# Patient Record
Sex: FEMALE | Race: ASIAN | NOT HISPANIC OR LATINO | URBAN - METROPOLITAN AREA
[De-identification: names, ages, dates, MRNs, and addresses within clinical notes are randomized per-mention and may not be internally consistent; named-entity substitution may affect disease eponyms.]

---

## 2023-08-22 ENCOUNTER — EMERGENCY (EMERGENCY)
Facility: HOSPITAL | Age: 28
LOS: 0 days | Discharge: HOME | End: 2023-08-22
Attending: STUDENT IN AN ORGANIZED HEALTH CARE EDUCATION/TRAINING PROGRAM
Payer: COMMERCIAL

## 2023-08-22 VITALS
OXYGEN SATURATION: 100 % | WEIGHT: 123.46 LBS | TEMPERATURE: 98 F | SYSTOLIC BLOOD PRESSURE: 119 MMHG | DIASTOLIC BLOOD PRESSURE: 64 MMHG | HEART RATE: 74 BPM | RESPIRATION RATE: 18 BRPM | HEIGHT: 61 IN

## 2023-08-22 VITALS
HEART RATE: 87 BPM | OXYGEN SATURATION: 99 % | SYSTOLIC BLOOD PRESSURE: 109 MMHG | DIASTOLIC BLOOD PRESSURE: 64 MMHG | RESPIRATION RATE: 18 BRPM | TEMPERATURE: 98 F

## 2023-08-22 DIAGNOSIS — F41.9 ANXIETY DISORDER, UNSPECIFIED: ICD-10-CM

## 2023-08-22 DIAGNOSIS — Z13.30 ENCOUNTER FOR SCREENING EXAMINATION FOR MENTAL HEALTH AND BEHAVIORAL DISORDERS, UNSPECIFIED: ICD-10-CM

## 2023-08-22 DIAGNOSIS — F32.A DEPRESSION, UNSPECIFIED: ICD-10-CM

## 2023-08-22 DIAGNOSIS — R51.9 HEADACHE, UNSPECIFIED: ICD-10-CM

## 2023-08-22 PROCEDURE — 99285 EMERGENCY DEPT VISIT HI MDM: CPT

## 2023-08-22 PROCEDURE — 93010 ELECTROCARDIOGRAM REPORT: CPT

## 2023-08-22 PROCEDURE — 70450 CT HEAD/BRAIN W/O DYE: CPT | Mod: 26,MG

## 2023-08-22 PROCEDURE — G1004: CPT

## 2023-08-22 RX ORDER — ALPRAZOLAM 0.25 MG
0.25 TABLET ORAL ONCE
Refills: 0 | Status: DISCONTINUED | OUTPATIENT
Start: 2023-08-22 | End: 2023-08-22

## 2023-08-22 RX ORDER — SODIUM CHLORIDE 9 MG/ML
1000 INJECTION INTRAMUSCULAR; INTRAVENOUS; SUBCUTANEOUS ONCE
Refills: 0 | Status: COMPLETED | OUTPATIENT
Start: 2023-08-22 | End: 2023-08-22

## 2023-08-22 RX ADMIN — Medication 0.25 MILLIGRAM(S): at 21:50

## 2023-08-22 NOTE — ED ADULT NURSE NOTE - CHIEF COMPLAINT QUOTE
depress unable to sleep , neck pain and headache sometimes, came a moth ago from [facility] waiting for a green card expecting it 2 weeks , pt stress duo to unable to get a job and unable to help family back home , pt has a good support with  . Denies S/I H/I  , pt had  a similar episode 4 years ago.

## 2023-08-22 NOTE — ED PROVIDER NOTE - PATIENT PORTAL LINK FT
You can access the FollowMyHealth Patient Portal offered by [facility] by registering at the following website: [email or web address]. By joining [employer]’s FollowMyHealth portal, you will also be able to view your health information using other applications (apps) compatible with our system.

## 2023-08-22 NOTE — ED PROVIDER NOTE - NS ED ROS FT
General: Denies fever, chills  HEENT: Denies sensory changes, sore throat  Neck: Denies neck pain, neck stiffness  Resp: Denies coughing, SOB  Cardiovascular: Denies CP, palpitations, LE edema  GI: Denies nausea, vomiting, abdominal pain, diarrhea, constipation, blood in stool  : Denies dysuria, hematuria, frequency, incontinence  MSK: Denies back pain  Neuro: + HA,  Denies dizziness, numbness, weakness  Skin: Denies rashes.

## 2023-08-22 NOTE — ED PROVIDER NOTE - PROGRESS NOTE DETAILS
sleeping comfortably in the bed. symptoms improved. f/u outpatient w/ primary care/psychiatry discussed w/ patient and .

## 2023-08-22 NOTE — ED PROVIDER NOTE - PHYSICAL EXAMINATION
General: Well appearing female in no acute distress  HEENT: Normocephalic, atraumatic. Moist mucous membranes. Oropharynx clear. No lymphadenopathy.  Eyes: No scleral icterus. EOMI. SHANNAN.  Neck:. Soft and supple. Full ROM without pain. No midline tenderness  Cardiac: Regular rate and regular rhythm. No murmurs, rubs, gallops. Peripheral pulses 2+ and symmetric. No LE edema.  Resp: Lungs CTAB. Speaking in full sentences. No wheezes, rales or rhonchi.  Abd: Soft, non-tender, non-distended. No guarding or rebound. No scars, masses, or lesions.  Back: Spine midline and non-tender. No CVA tenderness.    Skin: No rashes, abrasions, or lacerations.  Neuro: AO x 3. Moves all extremities symmetrically. Motor strength and sensation grossly intact.  Psych: anxious female. depressed affect but calm and cooperative. organized thoughts.

## 2023-08-22 NOTE — ED ADULT NURSE NOTE - OBJECTIVE STATEMENT
Pt is A&OX4, ambulatory, accompanied by . States she is unable to sleep due to being depressed. Came from [facility] about a month ago, waiting for green card. Pt is very stressed since she is not working and unable to help her family back home. Denies S/I, H/I. Pt had similar episode 4 years ago.

## 2023-08-22 NOTE — ED ADULT NURSE REASSESSMENT NOTE - NS ED NURSE REASSESS COMMENT FT1
Pt refused IVL, labs drawn, and IV fluids. States getting blood work done 3 days ago and does not want it. Dr. Gomez made aware.

## 2023-08-22 NOTE — ED ADULT NURSE NOTE - NSFALLUNIVINTERV_ED_ALL_ED
Bed/Stretcher in lowest position, wheels locked, appropriate side rails in place/Call bell, personal items and telephone in reach/Instruct patient to call for assistance before getting out of bed/chair/stretcher/Non-slip footwear applied when patient is off stretcher/[place] to call system/Physically safe environment - no spills, clutter or unnecessary equipment/Purposeful proactive rounding/Room/bathroom lighting operational, light cord in reach

## 2023-08-22 NOTE — ED PROVIDER NOTE - NSFOLLOWUPCLINICS_GEN_ALL_ED_FT
[facility]  Psychiatry  [address] [place] [address]  Phone: [phone]  Fax:     [facility]  [place] [address]  Phone: [phone]  Fax:

## 2023-08-22 NOTE — ED PROVIDER NOTE - OBJECTIVE STATEMENT
27 y/o F presents for medical evaluation. states she has been feeling depressed / anxious ever since she came here from [facility] aprox 1 month ago.  at bedside to provide collateral. denies SI/HI. denies visual/auditory hallucinations. states she had blood work done 2 days ago and does not want blood work to be done today. states she has only been sleeping few hours per day and that the time difference from [facility] is different. 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 they have not tried any over the counter medication at home, has not seen a psychiatrist. endorsing mild headache for same duration of time. denies nausea/vomiting. denies chest pain/sob. denies abdominal pain.

## 2023-08-22 NOTE — ED PROVIDER NOTE - CLINICAL SUMMARY MEDICAL DECISION MAKING FREE TEXT BOX
29 y/o F presents for medical evaluation. states she has been feeling depressed / anxious ever since she came here from [facility] aprox 1 month ago.  at bedside to provide collateral. denies SI/HI. denies visual/auditory hallucinations. states she had blood work done 2 days ago and does not want blood work to be done today. states she has only been sleeping few hours per day and that the time difference from [facility] is different. 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  depressed affect, anxious but calm and cooperative. organized thoughts. labs ordered but patient declined. she is amenable to CT head - r/o mass, bleed. likely anxiety/depression requiring outpatient psych eval - patient is NOT suicidal or homicidal w/ no hallucinations. will give xanax 1 tablet here in ER to help with anxiety. lack of sleep possibly due to her anxiety/depression. 27 y/o F presents for medical evaluation. states she has been feeling depressed / anxious ever since she came here from [facility] aprox 1 month ago.  at bedside to provide collateral. denies SI/HI. denies visual/auditory hallucinations. states she had blood work done 2 days ago and does not want blood work to be done today. states she has only been sleeping few hours per day and that the time difference from [facility] is different. 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  depressed affect, anxious but calm and cooperative. organized thoughts. labs ordered but patient declined. she is amenable to CT head - r/o mass, bleed. likely anxiety/depression requiring outpatient psych eval - patient is NOT suicidal or homicidal w/ no hallucinations. will give xanax 1 tablet here in ER to help with anxiety. lack of sleep possibly due to her anxiety/depression.    does not require admission at this time. improved symptoms, no SI/HI, no hallucinations, calm and cooperative.

## 2023-08-22 NOTE — ED PROVIDER NOTE - NSFOLLOWUPINSTRUCTIONS_ED_ALL_ED_FT
followup with primary care doctor/ psychiatry in next 1-10 days.     Anxiety    Generalized anxiety disorder (INOCENCIO) is a mental disorder. It is defined as anxiety that is not necessarily related to specific events or activities or is out of proportion to said events. Symptoms include restlessness, fatigue, difficulty concentrations, irritability and difficulty concentrating. It may interfere with life functions, including relationships, work, and school. If you were started on a medication, make sure to take exactly as prescribed and follow up with a psychiatrist.    SEEK IMMEDIATE MEDICAL CARE IF YOU HAVE ANY OF THE FOLLOWING SYMPTOMS: thoughts about hurting killing yourself, thoughts about hurting or killing somebody else, hallucinations, or worsening depression.

## 2023-08-22 NOTE — ED ADULT TRIAGE NOTE - CHIEF COMPLAINT QUOTE
depress unable to sleep , neck pain and headache sometimes, came a moth ago from [facility] waiting for a green card expecting it 2 weeks , pt stress duo to unable to get a job and unable to help family back home , pt has a good support with  . Denies S/I H/I  , pt had  a similar episode 4 years ago.

## 2023-08-22 NOTE — ED ADULT NURSE REASSESSMENT NOTE - NS ED NURSE REASSESS COMMENT FT1
Report from Lalitha HARTLEY . Introduced self and identified pt. Received alert & oriented x4, resting in stretcher, in no acute distress. Denies pain at this time.

## 2024-05-24 ENCOUNTER — EMERGENCY (EMERGENCY)
Facility: HOSPITAL | Age: 29
LOS: 1 days | Discharge: HOME | End: 2024-05-24
Attending: EMERGENCY MEDICINE | Admitting: EMERGENCY MEDICINE
Payer: COMMERCIAL

## 2024-05-24 VITALS
OXYGEN SATURATION: 99 % | TEMPERATURE: 99 F | SYSTOLIC BLOOD PRESSURE: 105 MMHG | HEART RATE: 96 BPM | RESPIRATION RATE: 16 BRPM | DIASTOLIC BLOOD PRESSURE: 70 MMHG

## 2024-05-24 PROBLEM — Z78.9 OTHER SPECIFIED HEALTH STATUS: Chronic | Status: ACTIVE | Noted: 2023-08-22

## 2024-05-24 LAB
ALBUMIN SERPL ELPH-MCNC: 3.6 G/DL — SIGNIFICANT CHANGE UP (ref 3.3–5)
ALP SERPL-CCNC: 74 U/L — SIGNIFICANT CHANGE UP (ref 40–120)
ALT FLD-CCNC: 16 U/L — SIGNIFICANT CHANGE UP (ref 4–33)
ANION GAP SERPL CALC-SCNC: 13 MMOL/L — SIGNIFICANT CHANGE UP (ref 7–14)
APPEARANCE UR: CLEAR — SIGNIFICANT CHANGE UP
APTT BLD: 33.9 SEC — SIGNIFICANT CHANGE UP (ref 24.5–35.6)
AST SERPL-CCNC: 20 U/L — SIGNIFICANT CHANGE UP (ref 4–32)
BASE EXCESS BLDV CALC-SCNC: 0.3 MMOL/L — SIGNIFICANT CHANGE UP (ref -2–3)
BASOPHILS # BLD AUTO: 0.03 K/UL — SIGNIFICANT CHANGE UP (ref 0–0.2)
BASOPHILS NFR BLD AUTO: 0.4 % — SIGNIFICANT CHANGE UP (ref 0–2)
BILIRUB SERPL-MCNC: <0.2 MG/DL — SIGNIFICANT CHANGE UP (ref 0.2–1.2)
BILIRUB UR-MCNC: NEGATIVE — SIGNIFICANT CHANGE UP
BUN SERPL-MCNC: 8 MG/DL — SIGNIFICANT CHANGE UP (ref 7–23)
CA-I SERPL-SCNC: 1.25 MMOL/L — SIGNIFICANT CHANGE UP (ref 1.15–1.33)
CALCIUM SERPL-MCNC: 9.1 MG/DL — SIGNIFICANT CHANGE UP (ref 8.4–10.5)
CHLORIDE BLDV-SCNC: 104 MMOL/L — SIGNIFICANT CHANGE UP (ref 96–108)
CHLORIDE SERPL-SCNC: 102 MMOL/L — SIGNIFICANT CHANGE UP (ref 98–107)
CO2 BLDV-SCNC: 26.7 MMOL/L — HIGH (ref 22–26)
CO2 SERPL-SCNC: 22 MMOL/L — SIGNIFICANT CHANGE UP (ref 22–31)
COLOR SPEC: YELLOW — SIGNIFICANT CHANGE UP
CREAT SERPL-MCNC: 0.73 MG/DL — SIGNIFICANT CHANGE UP (ref 0.5–1.3)
CRP SERPL-MCNC: 114.6 MG/L — HIGH
DIFF PNL FLD: NEGATIVE — SIGNIFICANT CHANGE UP
EGFR: 114 ML/MIN/1.73M2 — SIGNIFICANT CHANGE UP
EOSINOPHIL # BLD AUTO: 0.03 K/UL — SIGNIFICANT CHANGE UP (ref 0–0.5)
EOSINOPHIL NFR BLD AUTO: 0.4 % — SIGNIFICANT CHANGE UP (ref 0–6)
GAS PNL BLDV: 136 MMOL/L — SIGNIFICANT CHANGE UP (ref 136–145)
GAS PNL BLDV: SIGNIFICANT CHANGE UP
GAS PNL BLDV: SIGNIFICANT CHANGE UP
GLUCOSE BLDV-MCNC: 130 MG/DL — HIGH (ref 70–99)
GLUCOSE SERPL-MCNC: 127 MG/DL — HIGH (ref 70–99)
GLUCOSE UR QL: NEGATIVE MG/DL — SIGNIFICANT CHANGE UP
HCG SERPL-ACNC: <1 MIU/ML — SIGNIFICANT CHANGE UP
HCO3 BLDV-SCNC: 25 MMOL/L — SIGNIFICANT CHANGE UP (ref 22–29)
HCT VFR BLD CALC: 33.8 % — LOW (ref 34.5–45)
HCT VFR BLDA CALC: 35 % — SIGNIFICANT CHANGE UP (ref 34.5–46.5)
HGB BLD CALC-MCNC: 11.8 G/DL — SIGNIFICANT CHANGE UP (ref 11.7–16.1)
HGB BLD-MCNC: 11.4 G/DL — LOW (ref 11.5–15.5)
HIV 1+2 AB+HIV1 P24 AG SERPL QL IA: SIGNIFICANT CHANGE UP
IANC: 5.93 K/UL — SIGNIFICANT CHANGE UP (ref 1.8–7.4)
IMM GRANULOCYTES NFR BLD AUTO: 0.5 % — SIGNIFICANT CHANGE UP (ref 0–0.9)
INR BLD: 1.12 RATIO — SIGNIFICANT CHANGE UP (ref 0.85–1.18)
KETONES UR-MCNC: NEGATIVE MG/DL — SIGNIFICANT CHANGE UP
LACTATE BLDV-MCNC: 1.9 MMOL/L — SIGNIFICANT CHANGE UP (ref 0.5–2)
LACTATE SERPL-SCNC: 1.7 MMOL/L — SIGNIFICANT CHANGE UP (ref 0.5–2)
LEUKOCYTE ESTERASE UR-ACNC: NEGATIVE — SIGNIFICANT CHANGE UP
LYMPHOCYTES # BLD AUTO: 0.98 K/UL — LOW (ref 1–3.3)
LYMPHOCYTES # BLD AUTO: 12.9 % — LOW (ref 13–44)
MCHC RBC-ENTMCNC: 26.5 PG — LOW (ref 27–34)
MCHC RBC-ENTMCNC: 33.7 GM/DL — SIGNIFICANT CHANGE UP (ref 32–36)
MCV RBC AUTO: 78.6 FL — LOW (ref 80–100)
MONOCYTES # BLD AUTO: 0.58 K/UL — SIGNIFICANT CHANGE UP (ref 0–0.9)
MONOCYTES NFR BLD AUTO: 7.6 % — SIGNIFICANT CHANGE UP (ref 2–14)
NEUTROPHILS # BLD AUTO: 5.93 K/UL — SIGNIFICANT CHANGE UP (ref 1.8–7.4)
NEUTROPHILS NFR BLD AUTO: 78.2 % — HIGH (ref 43–77)
NITRITE UR-MCNC: NEGATIVE — SIGNIFICANT CHANGE UP
NRBC # BLD: 0 /100 WBCS — SIGNIFICANT CHANGE UP (ref 0–0)
NRBC # FLD: 0 K/UL — SIGNIFICANT CHANGE UP (ref 0–0)
PCO2 BLDV: 42 MMHG — SIGNIFICANT CHANGE UP (ref 39–52)
PH BLDV: 7.39 — SIGNIFICANT CHANGE UP (ref 7.32–7.43)
PH UR: 6.5 — SIGNIFICANT CHANGE UP (ref 5–8)
PLATELET # BLD AUTO: 239 K/UL — SIGNIFICANT CHANGE UP (ref 150–400)
PO2 BLDV: 35 MMHG — SIGNIFICANT CHANGE UP (ref 25–45)
POTASSIUM BLDV-SCNC: 3.5 MMOL/L — SIGNIFICANT CHANGE UP (ref 3.5–5.1)
POTASSIUM SERPL-MCNC: 3.4 MMOL/L — LOW (ref 3.5–5.3)
POTASSIUM SERPL-SCNC: 3.4 MMOL/L — LOW (ref 3.5–5.3)
PROT SERPL-MCNC: 7.4 G/DL — SIGNIFICANT CHANGE UP (ref 6–8.3)
PROT UR-MCNC: NEGATIVE MG/DL — SIGNIFICANT CHANGE UP
PROTHROM AB SERPL-ACNC: 12.6 SEC — SIGNIFICANT CHANGE UP (ref 9.5–13)
RBC # BLD: 4.3 M/UL — SIGNIFICANT CHANGE UP (ref 3.8–5.2)
RBC # FLD: 12.9 % — SIGNIFICANT CHANGE UP (ref 10.3–14.5)
SAO2 % BLDV: 58.9 % — LOW (ref 67–88)
SODIUM SERPL-SCNC: 137 MMOL/L — SIGNIFICANT CHANGE UP (ref 135–145)
SP GR SPEC: 1 — SIGNIFICANT CHANGE UP (ref 1–1.03)
UROBILINOGEN FLD QL: 0.2 MG/DL — SIGNIFICANT CHANGE UP (ref 0.2–1)
WBC # BLD: 7.59 K/UL — SIGNIFICANT CHANGE UP (ref 3.8–10.5)
WBC # FLD AUTO: 7.59 K/UL — SIGNIFICANT CHANGE UP (ref 3.8–10.5)

## 2024-05-24 PROCEDURE — 99284 EMERGENCY DEPT VISIT MOD MDM: CPT

## 2024-05-24 PROCEDURE — 71046 X-RAY EXAM CHEST 2 VIEWS: CPT | Mod: 26

## 2024-05-24 RX ORDER — SODIUM CHLORIDE 9 MG/ML
1500 INJECTION INTRAMUSCULAR; INTRAVENOUS; SUBCUTANEOUS ONCE
Refills: 0 | Status: COMPLETED | OUTPATIENT
Start: 2024-05-24 | End: 2024-05-24

## 2024-05-24 RX ORDER — SODIUM CHLORIDE 9 MG/ML
1000 INJECTION INTRAMUSCULAR; INTRAVENOUS; SUBCUTANEOUS ONCE
Refills: 0 | Status: COMPLETED | OUTPATIENT
Start: 2024-05-24 | End: 2024-05-24

## 2024-05-24 RX ORDER — FAMOTIDINE 10 MG/ML
20 INJECTION INTRAVENOUS ONCE
Refills: 0 | Status: COMPLETED | OUTPATIENT
Start: 2024-05-24 | End: 2024-05-24

## 2024-05-24 RX ORDER — METOCLOPRAMIDE HCL 10 MG
10 TABLET ORAL ONCE
Refills: 0 | Status: COMPLETED | OUTPATIENT
Start: 2024-05-24 | End: 2024-05-24

## 2024-05-24 RX ORDER — ACETAMINOPHEN 500 MG
1000 TABLET ORAL ONCE
Refills: 0 | Status: COMPLETED | OUTPATIENT
Start: 2024-05-24 | End: 2024-05-24

## 2024-05-24 RX ORDER — IBUPROFEN 200 MG
600 TABLET ORAL ONCE
Refills: 0 | Status: COMPLETED | OUTPATIENT
Start: 2024-05-24 | End: 2024-05-24

## 2024-05-24 RX ADMIN — Medication 600 MILLIGRAM(S): at 15:31

## 2024-05-24 RX ADMIN — Medication 400 MILLIGRAM(S): at 17:32

## 2024-05-24 RX ADMIN — Medication 10 MILLIGRAM(S): at 17:48

## 2024-05-24 RX ADMIN — SODIUM CHLORIDE 1000 MILLILITER(S): 9 INJECTION INTRAMUSCULAR; INTRAVENOUS; SUBCUTANEOUS at 18:05

## 2024-05-24 RX ADMIN — FAMOTIDINE 20 MILLIGRAM(S): 10 INJECTION INTRAVENOUS at 17:33

## 2024-05-24 RX ADMIN — SODIUM CHLORIDE 1500 MILLILITER(S): 9 INJECTION INTRAMUSCULAR; INTRAVENOUS; SUBCUTANEOUS at 15:31

## 2024-05-24 NOTE — ED ADULT TRIAGE NOTE - CHIEF COMPLAINT QUOTE
Pt presents for multiple complaints, endorsing fever, chills, abdominal pain, headache and neck pain for the past 2 weeks. Pt took Tylenol this morning.

## 2024-05-24 NOTE — ED PROVIDER NOTE - NSFOLLOWUPCLINICS_GEN_ALL_ED_FT
[facility] - Infectious Disease  Infectious Disease  [address], [place] [address]  Phone: [phone]  Fax:

## 2024-05-24 NOTE — ED PROVIDER NOTE - PROGRESS NOTE DETAILS
Shabbir DIAZ, PGY-1;  Patient continues to c/o headache, will prescribe tylenol and reglan. Awaiting UA results. Review of labs nonactionable. Elevated CRP noted.

## 2024-05-24 NOTE — ED PROVIDER NOTE - CLINICAL SUMMARY MEDICAL DECISION MAKING FREE TEXT BOX
28-year-old female with no significant past medical history presenting with 8 days of fever.  Patient states Tmax 102 Fahrenheit.  Endorses associated body aches, neck pain, nonproductive cough.  She additionally endorses epigastric abdominal pain, worse after eating.  She denies dysuria, hematuria, vaginal bleeding, vaginal discharge, chest pain, shortness of breath.  Patient was evaluated at urgent care/primary care center and at that time prescribed azithromycin, pantoprazole, Tessalon Perles.  Patient been taking these medications for approximately 2 days without relief of symptoms. On arrival pt. is HD stable. 28-year-old female with no significant past medical history presenting with 8 days of fever.  Patient states Tmax 102 Fahrenheit.  Endorses associated body aches, neck pain, nonproductive cough.  She additionally endorses epigastric abdominal pain, worse after eating.  She denies dysuria, hematuria, vaginal bleeding, vaginal discharge, chest pain, shortness of breath.  Patient was evaluated at urgent care/primary care center and at that time prescribed azithromycin, pantoprazole, Tessalon Perles.  Patient been taking these medications for approximately 2 days without relief of symptoms. On arrival pt. is HD stable, nonfebrile and not tachycardic. On PE there are no rashes, patient is AOx3 answering questions appropriately, lungs are clear to auscultation b/l, no cardiac murmurs to auscultation, abdomen soft/nt/nd, no peripheral edema/swelling. Patient with perisistent fevers despite tylenol, will order cbc,cmp,ua,ucx,CXR,blood cultures, crp, will hydrate patient and order ibuprofen for body aches. Differential includes viral URI, PNA, UTI.

## 2024-05-24 NOTE — ED ADULT NURSE NOTE - OBJECTIVE STATEMENT
Pt arrives to intake. Pt is A and Ox4 and ambualtory. Hx: . Pt arrives to the ED complaining of fever and chills. Pt states she was seen at another ED with a negative work-up. Pt endorses abdominal pain that is constant. Pt endorses a headache that is worse with coughing. Airway is patent, respirations are even and unlabored. Pt denies chest pain, shortness of breath, dizziness, numbness and tingling, nausea/vomitting/diarrhea. Skin is clean, dry, intact, and appropriate for race.  20 G IV placed in the L AC. Labs sent per provider orders. Pt medicated per MAR. Plan of care ongoing, safety maintained.

## 2024-05-24 NOTE — ED PROVIDER NOTE - OBJECTIVE STATEMENT
28-year-old female with no significant past medical history presenting with 8 days of fever.  Patient states Tmax 102 Fahrenheit.  Endorses associated body aches, neck pain, nonproductive cough.  She additionally endorses epigastric abdominal pain, worse after eating.  She denies dysuria, hematuria, vaginal bleeding, vaginal discharge, chest pain, shortness of breath.  Patient was evaluated at urgent care/primary care center and at that time prescribed azithromycin, pantoprazole, Tessalon Perles.  Patient been taking these medications for approximately 2 days without relief of symptoms.

## 2024-05-24 NOTE — ED PROVIDER NOTE - NSFOLLOWUPINSTRUCTIONS_ED_ALL_ED_FT
Today you were evaluated at [facility] ED for fever.    While you were here we preformed blood work, chest xray and urine testing.  -The results of these studies will be attached to the following pages    For fever control:  -Ibuprofen 400mg every 6 hours as needed  -Tylenol 650mg every 6 hours as needed    Please follow up with your primary care provider:  -Results to follow up include elevated CRP    We want you to follow up with:  -Rheumatology  -Infectious Disease    Continue to take azithromycin, pantoprazole and tessalon perles as prescribed by your primary care provider    Return to ED for inability to tolerate PO, worsening fevers, severe abdominal pain, vomiting.

## 2024-05-24 NOTE — ED PROVIDER NOTE - PATIENT PORTAL LINK FT
You can access the FollowMyHealth Patient Portal offered by [facility] by registering at the following website: [email or web address]. By joining [employer]’s FollowMyHealth portal, you will also be able to view your health information using other applications (apps) compatible with our system.

## 2024-05-24 NOTE — ED PROVIDER NOTE - ATTENDING CONTRIBUTION TO CARE
Patient presents for evaluation of what appears to be evolving to qualify for definition of FUO; weeks of fever w/o diagnosis of usual pathogens responsible for same and except for vague epigastric pain ( which could be 2/2 subacute NSAID use to control fever and myalgias) HA (no congestion, cough rhinorrhea nasal discharge) and elevated CRP no symptoms   VSS afebrile in ED  Plan  symptomatic tx while excalating w/u to include bacteremia will repeat rvp could represent viruses (EBV etc) doubt malignancy   f/u rheum (<weeks of symptoms but would still refer for the logistics of arranging followup) and ID   d/c with followup  RTED PRN

## 2024-05-24 NOTE — ED ADULT NURSE NOTE - NSFALLUNIVINTERV_ED_ALL_ED
Bed/Stretcher in lowest position, wheels locked, appropriate side rails in place/Call bell, personal items and telephone in reach/Instruct patient to call for assistance before getting out of bed/chair/stretcher/Non-slip footwear applied when patient is off stretcher/[place] to call system/Physically safe environment - no spills, clutter or unnecessary equipment/Purposeful proactive rounding/Room/bathroom lighting operational, light cord in reach

## 2024-05-29 LAB
CULTURE RESULTS: SIGNIFICANT CHANGE UP
CULTURE RESULTS: SIGNIFICANT CHANGE UP
SPECIMEN SOURCE: SIGNIFICANT CHANGE UP
SPECIMEN SOURCE: SIGNIFICANT CHANGE UP

## 2024-10-09 PROBLEM — Z00.00 ENCOUNTER FOR PREVENTIVE HEALTH EXAMINATION: Status: ACTIVE | Noted: 2024-10-09

## 2024-10-10 ENCOUNTER — APPOINTMENT (OUTPATIENT)
Dept: OBGYN | Facility: CLINIC | Age: 29
End: 2024-10-10
Payer: COMMERCIAL

## 2024-10-10 ENCOUNTER — ASOB RESULT (OUTPATIENT)
Age: 29
End: 2024-10-10

## 2024-10-10 VITALS
HEART RATE: 73 BPM | WEIGHT: 138 LBS | SYSTOLIC BLOOD PRESSURE: 110 MMHG | HEIGHT: 61 IN | BODY MASS INDEX: 26.06 KG/M2 | DIASTOLIC BLOOD PRESSURE: 78 MMHG

## 2024-10-10 DIAGNOSIS — N89.8 OTHER SPECIFIED NONINFLAMMATORY DISORDERS OF VAGINA: ICD-10-CM

## 2024-10-10 DIAGNOSIS — N91.2 AMENORRHEA, UNSPECIFIED: ICD-10-CM

## 2024-10-10 DIAGNOSIS — Z01.419 ENCOUNTER FOR GYNECOLOGICAL EXAMINATION (GENERAL) (ROUTINE) W/OUT ABNORMAL FINDINGS: ICD-10-CM

## 2024-10-10 PROCEDURE — 99459 PELVIC EXAMINATION: CPT

## 2024-10-10 PROCEDURE — 99204 OFFICE O/P NEW MOD 45 MIN: CPT | Mod: 25

## 2024-10-10 PROCEDURE — 99385 PREV VISIT NEW AGE 18-39: CPT

## 2024-10-10 PROCEDURE — 76817 TRANSVAGINAL US OBSTETRIC: CPT

## 2024-10-10 RX ORDER — CHROMIUM 200 MCG
TABLET ORAL
Refills: 0 | Status: ACTIVE | COMMUNITY

## 2024-10-10 RX ORDER — MULTIVIT-MIN/IRON/FOLIC ACID/K 18-600-40
27-0.8-2 CAPSULE ORAL DAILY
Qty: 30 | Refills: 6 | Status: ACTIVE | COMMUNITY
Start: 2024-10-10 | End: 1900-01-01

## 2024-10-13 LAB
ABO + RH PNL BLD: NORMAL
ALBUMIN SERPL ELPH-MCNC: 4.2 G/DL
ALP BLD-CCNC: 69 U/L
ALT SERPL-CCNC: 11 U/L
ANION GAP SERPL CALC-SCNC: 14 MMOL/L
AST SERPL-CCNC: 16 U/L
B19V IGG SER QL IA: 0.19 INDEX
B19V IGG+IGM SER-IMP: NEGATIVE
B19V IGG+IGM SER-IMP: NORMAL
B19V IGM FLD-ACNC: 0.49 INDEX
B19V IGM SER-ACNC: NEGATIVE
BACTERIA UR CULT: NORMAL
BASOPHILS # BLD AUTO: 0.05 K/UL
BASOPHILS NFR BLD AUTO: 0.5 %
BILIRUB SERPL-MCNC: 0.2 MG/DL
BLD GP AB SCN SERPL QL: NORMAL
BUN SERPL-MCNC: 11 MG/DL
BV BACTERIA RRNA VAG QL NAA+PROBE: NOT DETECTED
C GLABRATA RNA VAG QL NAA+PROBE: NOT DETECTED
C TRACH RRNA SPEC QL NAA+PROBE: NOT DETECTED
CALCIUM SERPL-MCNC: 9.4 MG/DL
CANDIDA RRNA VAG QL PROBE: NOT DETECTED
CHLORIDE SERPL-SCNC: 102 MMOL/L
CO2 SERPL-SCNC: 20 MMOL/L
CREAT SERPL-MCNC: 0.72 MG/DL
EGFR: 115 ML/MIN/1.73M2
EOSINOPHIL # BLD AUTO: 0.06 K/UL
EOSINOPHIL NFR BLD AUTO: 0.6 %
ESTIMATED AVERAGE GLUCOSE: 111 MG/DL
GLUCOSE SERPL-MCNC: 75 MG/DL
HBA1C MFR BLD HPLC: 5.5 %
HBV SURFACE AG SER QL: NONREACTIVE
HCT VFR BLD CALC: 38.8 %
HCV AB SER QL: NONREACTIVE
HCV S/CO RATIO: 0.21 S/CO
HGB A MFR BLD: 97.4 %
HGB A2 MFR BLD: 2.6 %
HGB BLD-MCNC: 12.1 G/DL
HGB FRACT BLD-IMP: NORMAL
HIV1+2 AB SPEC QL IA.RAPID: NONREACTIVE
HPV HIGH+LOW RISK DNA PNL CVX: NOT DETECTED
IMM GRANULOCYTES NFR BLD AUTO: 0.6 %
LEAD BLD-MCNC: 3.3 UG/DL
LYMPHOCYTES # BLD AUTO: 2.44 K/UL
LYMPHOCYTES NFR BLD AUTO: 23.4 %
MAN DIFF?: NORMAL
MCHC RBC-ENTMCNC: 26.1 PG
MCHC RBC-ENTMCNC: 31.2 GM/DL
MCV RBC AUTO: 83.6 FL
MEV IGG FLD QL IA: 73.9 AU/ML
MEV IGG+IGM SER-IMP: POSITIVE
MONOCYTES # BLD AUTO: 0.73 K/UL
MONOCYTES NFR BLD AUTO: 7 %
MUV AB SER-ACNC: POSITIVE
MUV IGG SER QL IA: 73.9 AU/ML
N GONORRHOEA RRNA SPEC QL NAA+PROBE: NOT DETECTED
NEUTROPHILS # BLD AUTO: 7.1 K/UL
NEUTROPHILS NFR BLD AUTO: 67.9 %
PLATELET # BLD AUTO: 305 K/UL
POTASSIUM SERPL-SCNC: 4 MMOL/L
PROT SERPL-MCNC: 7.3 G/DL
RBC # BLD: 4.64 M/UL
RBC # FLD: 14.6 %
RUBV IGG FLD-ACNC: 6.95 INDEX
RUBV IGG SER-IMP: POSITIVE
SODIUM SERPL-SCNC: 137 MMOL/L
T GONDII AB SER-IMP: NEGATIVE
T GONDII AB SER-IMP: POSITIVE
T GONDII IGG SER QL: 73.3 IU/ML
T GONDII IGM SER QL: <3 AU/ML
T PALLIDUM AB SER QL IA: NEGATIVE
T VAGINALIS RRNA SPEC QL NAA+PROBE: NOT DETECTED
TSH SERPL-ACNC: 2.83 UIU/ML
VZV AB TITR SER: POSITIVE
VZV IGG SER IF-ACNC: 1.44 S/CO
WBC # FLD AUTO: 10.44 K/UL

## 2024-10-18 LAB
AR GENE MUT ANL BLD/T: NORMAL
CFTR MUT TESTED BLD/T: NEGATIVE
CYTOLOGY CVX/VAG DOC THIN PREP: NORMAL
FMR1 GENE MUT ANL BLD/T: NORMAL
M TB IFN-G BLD-IMP: NEGATIVE
QUANTIFERON TB PLUS MITOGEN MINUS NIL: 6.73 IU/ML
QUANTIFERON TB PLUS NIL: 0.02 IU/ML
QUANTIFERON TB PLUS TB1 MINUS NIL: 0 IU/ML
QUANTIFERON TB PLUS TB2 MINUS NIL: 0.01 IU/ML

## 2024-10-25 ENCOUNTER — EMERGENCY (EMERGENCY)
Facility: HOSPITAL | Age: 29
LOS: 1 days | Discharge: HOME | End: 2024-10-25
Attending: STUDENT IN AN ORGANIZED HEALTH CARE EDUCATION/TRAINING PROGRAM
Payer: COMMERCIAL

## 2024-10-25 VITALS
RESPIRATION RATE: 17 BRPM | HEART RATE: 94 BPM | DIASTOLIC BLOOD PRESSURE: 65 MMHG | HEIGHT: 61 IN | SYSTOLIC BLOOD PRESSURE: 94 MMHG | WEIGHT: 145.51 LBS | TEMPERATURE: 98 F | OXYGEN SATURATION: 100 %

## 2024-10-25 PROCEDURE — 99284 EMERGENCY DEPT VISIT MOD MDM: CPT | Mod: 25

## 2024-10-25 RX ORDER — ONDANSETRON HCL/PF 4 MG/2 ML
4 VIAL (ML) INJECTION ONCE
Refills: 0 | Status: COMPLETED | OUTPATIENT
Start: 2024-10-25 | End: 2024-10-25

## 2024-10-25 RX ORDER — FAMOTIDINE 40 MG
20 TABLET ORAL ONCE
Refills: 0 | Status: COMPLETED | OUTPATIENT
Start: 2024-10-25 | End: 2024-10-25

## 2024-10-25 RX ORDER — ACETAMINOPHEN 325 MG
1000 TABLET ORAL ONCE
Refills: 0 | Status: COMPLETED | OUTPATIENT
Start: 2024-10-25 | End: 2024-10-25

## 2024-10-25 NOTE — ED ADULT TRIAGE NOTE - BSA (M2)
Plan of care reviewed with pt. Pt is A & O x4, VSS, afebrile. Pt is independent with walker. Pt completed US of kidney. Pt continues with swanson, no urinary output overnight. Pt completed NS bolus last night. Pt continues on continuous IVF. No acute events overnight. Pt in non-skid footwear, bed locked, in lowest position, call bell in reach. Will continue to monitor.    1.65

## 2024-10-26 VITALS — DIASTOLIC BLOOD PRESSURE: 58 MMHG | SYSTOLIC BLOOD PRESSURE: 102 MMHG

## 2024-10-26 LAB
ALBUMIN SERPL ELPH-MCNC: 4.3 G/DL — SIGNIFICANT CHANGE UP (ref 3.3–5)
ALP SERPL-CCNC: 68 U/L — SIGNIFICANT CHANGE UP (ref 40–120)
ALT FLD-CCNC: 16 U/L — SIGNIFICANT CHANGE UP (ref 10–45)
ANION GAP SERPL CALC-SCNC: 14 MMOL/L — SIGNIFICANT CHANGE UP (ref 5–17)
APPEARANCE UR: CLEAR — SIGNIFICANT CHANGE UP
AST SERPL-CCNC: 21 U/L — SIGNIFICANT CHANGE UP (ref 10–40)
BACTERIA # UR AUTO: NEGATIVE /HPF — SIGNIFICANT CHANGE UP
BILIRUB SERPL-MCNC: 0.3 MG/DL — SIGNIFICANT CHANGE UP (ref 0.2–1.2)
BILIRUB UR-MCNC: NEGATIVE — SIGNIFICANT CHANGE UP
BUN SERPL-MCNC: 10 MG/DL — SIGNIFICANT CHANGE UP (ref 7–23)
CALCIUM SERPL-MCNC: 9.8 MG/DL — SIGNIFICANT CHANGE UP (ref 8.4–10.5)
CAST: 0 /LPF — SIGNIFICANT CHANGE UP (ref 0–4)
CHLORIDE SERPL-SCNC: 103 MMOL/L — SIGNIFICANT CHANGE UP (ref 96–108)
CO2 SERPL-SCNC: 19 MMOL/L — LOW (ref 22–31)
COLOR SPEC: YELLOW — SIGNIFICANT CHANGE UP
CREAT SERPL-MCNC: 0.59 MG/DL — SIGNIFICANT CHANGE UP (ref 0.5–1.3)
DIFF PNL FLD: NEGATIVE — SIGNIFICANT CHANGE UP
EGFR: 124 ML/MIN/1.73M2 — SIGNIFICANT CHANGE UP
GLUCOSE SERPL-MCNC: 81 MG/DL — SIGNIFICANT CHANGE UP (ref 70–99)
GLUCOSE UR QL: NEGATIVE MG/DL — SIGNIFICANT CHANGE UP
HCG SERPL-ACNC: HIGH MIU/ML
HCT VFR BLD CALC: 39.2 % — SIGNIFICANT CHANGE UP (ref 34.5–45)
HGB BLD-MCNC: 12.8 G/DL — SIGNIFICANT CHANGE UP (ref 11.5–15.5)
KETONES UR-MCNC: ABNORMAL MG/DL
LEUKOCYTE ESTERASE UR-ACNC: NEGATIVE — SIGNIFICANT CHANGE UP
MCHC RBC-ENTMCNC: 25.8 PG — LOW (ref 27–34)
MCHC RBC-ENTMCNC: 32.7 GM/DL — SIGNIFICANT CHANGE UP (ref 32–36)
MCV RBC AUTO: 79 FL — LOW (ref 80–100)
NITRITE UR-MCNC: NEGATIVE — SIGNIFICANT CHANGE UP
NRBC # BLD: 0 /100 WBCS — SIGNIFICANT CHANGE UP (ref 0–0)
PH UR: 7 — SIGNIFICANT CHANGE UP (ref 5–8)
PLATELET # BLD AUTO: 328 K/UL — SIGNIFICANT CHANGE UP (ref 150–400)
POTASSIUM SERPL-MCNC: 3.6 MMOL/L — SIGNIFICANT CHANGE UP (ref 3.5–5.3)
POTASSIUM SERPL-SCNC: 3.6 MMOL/L — SIGNIFICANT CHANGE UP (ref 3.5–5.3)
PROT SERPL-MCNC: 8.1 G/DL — SIGNIFICANT CHANGE UP (ref 6–8.3)
PROT UR-MCNC: NEGATIVE MG/DL — SIGNIFICANT CHANGE UP
RBC # BLD: 4.96 M/UL — SIGNIFICANT CHANGE UP (ref 3.8–5.2)
RBC # FLD: 14.1 % — SIGNIFICANT CHANGE UP (ref 10.3–14.5)
RBC CASTS # UR COMP ASSIST: 0 /HPF — SIGNIFICANT CHANGE UP (ref 0–4)
SODIUM SERPL-SCNC: 136 MMOL/L — SIGNIFICANT CHANGE UP (ref 135–145)
SP GR SPEC: 1.01 — SIGNIFICANT CHANGE UP (ref 1–1.03)
SQUAMOUS # UR AUTO: 0 /HPF — SIGNIFICANT CHANGE UP (ref 0–5)
UROBILINOGEN FLD QL: 0.2 MG/DL — SIGNIFICANT CHANGE UP (ref 0.2–1)
WBC # BLD: 15.25 K/UL — HIGH (ref 3.8–10.5)
WBC # FLD AUTO: 15.25 K/UL — HIGH (ref 3.8–10.5)
WBC UR QL: 0 /HPF — SIGNIFICANT CHANGE UP (ref 0–5)

## 2024-10-26 PROCEDURE — 86901 BLOOD TYPING SEROLOGIC RH(D): CPT

## 2024-10-26 PROCEDURE — 81001 URINALYSIS AUTO W/SCOPE: CPT

## 2024-10-26 PROCEDURE — 76817 TRANSVAGINAL US OBSTETRIC: CPT | Mod: 26

## 2024-10-26 PROCEDURE — 82435 ASSAY OF BLOOD CHLORIDE: CPT

## 2024-10-26 PROCEDURE — 82947 ASSAY GLUCOSE BLOOD QUANT: CPT

## 2024-10-26 PROCEDURE — 83605 ASSAY OF LACTIC ACID: CPT

## 2024-10-26 PROCEDURE — 85018 HEMOGLOBIN: CPT

## 2024-10-26 PROCEDURE — 84295 ASSAY OF SERUM SODIUM: CPT

## 2024-10-26 PROCEDURE — 84132 ASSAY OF SERUM POTASSIUM: CPT

## 2024-10-26 PROCEDURE — 82803 BLOOD GASES ANY COMBINATION: CPT

## 2024-10-26 PROCEDURE — 84702 CHORIONIC GONADOTROPIN TEST: CPT

## 2024-10-26 PROCEDURE — 82330 ASSAY OF CALCIUM: CPT

## 2024-10-26 PROCEDURE — 99285 EMERGENCY DEPT VISIT HI MDM: CPT | Mod: 25

## 2024-10-26 PROCEDURE — 86900 BLOOD TYPING SEROLOGIC ABO: CPT

## 2024-10-26 PROCEDURE — 86850 RBC ANTIBODY SCREEN: CPT

## 2024-10-26 PROCEDURE — 80053 COMPREHEN METABOLIC PANEL: CPT

## 2024-10-26 PROCEDURE — 93975 VASCULAR STUDY: CPT

## 2024-10-26 PROCEDURE — 93975 VASCULAR STUDY: CPT | Mod: 26

## 2024-10-26 PROCEDURE — 85027 COMPLETE CBC AUTOMATED: CPT

## 2024-10-26 PROCEDURE — 85014 HEMATOCRIT: CPT

## 2024-10-26 PROCEDURE — 76817 TRANSVAGINAL US OBSTETRIC: CPT

## 2024-10-26 PROCEDURE — 87086 URINE CULTURE/COLONY COUNT: CPT

## 2024-10-26 RX ORDER — MAG HYDROX/ALUMINUM HYD/SIMETH 200-200-20
30 SUSPENSION, ORAL (FINAL DOSE FORM) ORAL ONCE
Refills: 0 | Status: COMPLETED | OUTPATIENT
Start: 2024-10-26 | End: 2024-10-26

## 2024-10-26 RX ORDER — ONDANSETRON HCL/PF 4 MG/2 ML
4 VIAL (ML) INJECTION ONCE
Refills: 0 | Status: DISCONTINUED | OUTPATIENT
Start: 2024-10-26 | End: 2024-10-26

## 2024-10-26 RX ADMIN — Medication 30 MILLILITER(S): at 07:46

## 2024-10-26 NOTE — ED PROVIDER NOTE - NSFOLLOWUPCLINICS_GEN_ALL_ED_FT
[facility] Gynecology and Obstetrics  Gynceology/OB  [address] [place] [address]  Phone: [phone]  Fax:   Follow Up Time: 4-6 Days

## 2024-10-26 NOTE — ED ADULT NURSE REASSESSMENT NOTE - NS ED NURSE REASSESS COMMENT FT1
Attempted to obtain IV access on RAC. Venipuncture performed and blood drawn for lab. When attempting to flush IV pt c/o pain and withdrew RUE. IV catheter displaced. Pt declined additional attempt of IV insertion. Pt declined medications at this time. Pt placed in position of comfort, awaiting US. Stretcher locked and in lowest position, appropriate side rails up. Pt instructed to notify RN if assistance is needed.

## 2024-10-26 NOTE — ED PROVIDER NOTE - PHYSICAL EXAMINATION
GEN: awake and alert, well-appearing, no acute distress  CV: (+) RRR, (-) murmurs/rubs/gallops  RESP: (+) CTABL, (-) increased WOB, (-) rales, (-) rhonchi, (-) wheezing  ABD: (+) soft, (+) diffuse lower abdominal tenderness, (-) guarding  EXT: (-) joint deformities, (-) edema, (-) tenderness, (+) grossly intact ROM, (+) equal pulses in upper & lower extremities  NEURO: mental status as above, (-) gross strength/sensation deficits

## 2024-10-26 NOTE — ED PROVIDER NOTE - NSFOLLOWUPINSTRUCTIONS_ED_ALL_ED_FT
It was a pleasure caring for you today!    You were seen in the ER today for abominal pain. Please follow up with your ob/gyn within the next 3-5 days.     Please follow up with your primary care doctor within 1 - 3 days. Call and let them know you were seen in the ER today.   Bring the results of your blood work and imaging with you to your appointment, if applicable.    For pain, please take acetaminophen 650 mg every 6 hours for pain. Additionally, you can also take ibuprofen 400 mg every 6-8 hours for pain.    Return to the ER for any worsening symptoms or concerns, including chest pain, shortness of breath, lightheadedness, weakness, or any other concerns. It was a pleasure caring for you today!    You were seen in the ER today for abdominal pain. Please follow up with your ob/gyn within the next 3-5 days.     Please follow up with your primary care doctor within 1 - 3 days. Call and let them know you were seen in the ER today.   Bring the results of your blood work and imaging with you to your appointment, if applicable.    For pain, please take acetaminophen 650 mg every 6 hours for pain. Additionally, you can also take ibuprofen 400 mg every 6-8 hours for pain.    Return to the ER for any worsening symptoms or concerns, including chest pain, shortness of breath, lightheadedness, weakness, or any other concerns.

## 2024-10-26 NOTE — ED PROVIDER NOTE - PATIENT PORTAL LINK FT
You can access the FollowMyHealth Patient Portal offered by [facility] by registering at the following website: [email or web address]. By joining [employer]’s FollowMyHealth portal, you will also be able to view your health information using other applications (apps) compatible with our system.

## 2024-10-26 NOTE — ED ADULT NURSE REASSESSMENT NOTE - NS ED NURSE REASSESS COMMENT FT1
MD Delarosa aware of BP, pt denying any new symptoms. Pt continues to endorse neck pain and some weakness the same as last night no change in condition. Pt is comfortable with going home and states she wants to go home. MD Delarosa okay with discharge.

## 2024-10-26 NOTE — ED ADULT NURSE NOTE - OBJECTIVE STATEMENT
28y/o Female SILVANO presents to ED c/o abdominal discomfort and pain. Pt states she is 8 weeks pregnant concerned for activity of fetus. Pt denies pertinent medical history. Pt denies headache, chest pain, SOB. Pt is AxOx4. ABCs intact. Pt neuro intact. Full ROM x4 extremities, peripheral pulses and sensation present. Pt demonstrates continence, is ambulatory unassisted. Pt states allergies NKDA. Stretcher locked and in lowest position, appropriate side rails up. Pt instructed to notify RN if assistance is needed.
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
## 2024-10-26 NOTE — ED ADULT NURSE REASSESSMENT NOTE - NS ED NURSE REASSESS COMMENT FT1
As per MD Baxter pt ok to eat. Pt provided food and drink. Stretcher locked and in lowest position, appropriate side rails up. Pt instructed to notify RN if assistance is needed. As per MD Baxter pt ok to eat. Pt provided food and drink. Pt advised to provide urine sample for testing pt declined at this time to cooperate. Pt awaiting US. Stretcher locked and in lowest position, appropriate side rails up. Pt instructed to notify RN if assistance is needed.
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
## 2024-10-27 LAB
CULTURE RESULTS: NO GROWTH — SIGNIFICANT CHANGE UP
SPECIMEN SOURCE: SIGNIFICANT CHANGE UP

## 2024-10-28 ENCOUNTER — APPOINTMENT (OUTPATIENT)
Dept: OBGYN | Facility: CLINIC | Age: 29
End: 2024-10-28
Payer: COMMERCIAL

## 2024-10-28 ENCOUNTER — ASOB RESULT (OUTPATIENT)
Age: 29
End: 2024-10-28

## 2024-10-28 VITALS
SYSTOLIC BLOOD PRESSURE: 113 MMHG | BODY MASS INDEX: 26.43 KG/M2 | WEIGHT: 140 LBS | HEART RATE: 83 BPM | HEIGHT: 61 IN | DIASTOLIC BLOOD PRESSURE: 73 MMHG

## 2024-10-28 DIAGNOSIS — O21.9 VOMITING OF PREGNANCY, UNSPECIFIED: ICD-10-CM

## 2024-10-28 PROCEDURE — 99214 OFFICE O/P EST MOD 30 MIN: CPT

## 2024-10-28 PROCEDURE — 76817 TRANSVAGINAL US OBSTETRIC: CPT

## 2024-10-28 RX ORDER — DOXYLAMINE SUCCINATE AND PYRIDOXINE HYDROCHLORIDE 10; 10 MG/1; MG/1
10-10 TABLET, DELAYED RELEASE ORAL
Qty: 120 | Refills: 2 | Status: ACTIVE | COMMUNITY
Start: 2024-10-28 | End: 1900-01-01

## 2024-11-02 PROBLEM — Z78.9 OTHER SPECIFIED HEALTH STATUS: Chronic | Status: ACTIVE | Noted: 2024-10-26

## 2024-11-08 ENCOUNTER — APPOINTMENT (OUTPATIENT)
Dept: OBGYN | Facility: CLINIC | Age: 29
End: 2024-11-08
Payer: COMMERCIAL

## 2024-11-08 PROCEDURE — 99211 OFF/OP EST MAY X REQ PHY/QHP: CPT

## 2024-11-14 NOTE — ED ADULT NURSE NOTE - SUICIDE SCREENING QUESTION 3
Subjective   Javed Leach is a 2 y.o. male who is brought in for this well child visit.  Immunization History   Administered Date(s) Administered    DTaP HepB IPV combined vaccine, pedatric (PEDIARIX) 2021, 2021, 2021    DTaP vaccine, pediatric  (INFANRIX) 12/15/2022    Hepatitis A vaccine, pediatric/adolescent (HAVRIX, VAQTA) 06/27/2022, 02/01/2024    Hepatitis B vaccine, 19 yrs and under (RECOMBIVAX, ENGERIX) 2021    HiB PRP-OMP conjugate vaccine, pediatric (PEDVAXHIB) 12/15/2022    HiB PRP-T conjugate vaccine (HIBERIX, ACTHIB) 2021, 2021, 2021    MMR and varicella combined vaccine, subcutaneous (PROQUAD) 12/15/2022    MMR vaccine, subcutaneous (MMR II) 06/27/2022    Pneumococcal conjugate vaccine, 13-valent (PREVNAR 13) 2021, 2021, 2021, 12/15/2022    Rotavirus pentavalent vaccine, oral (ROTATEQ) 2021, 2021, 2021    Varicella vaccine, subcutaneous (VARIVAX) 06/27/2022     History of previous adverse reactions to immunizations? no  The following portions of the patient's history were reviewed by a provider in this encounter and updated as appropriate:  Allergies  Meds  Problems       Well Child Assessment:  History was provided by the mother and father. Javed lives with his mother and father.   Nutrition  Types of intake include cereals, cow's milk, eggs, meats, vegetables and fruits.   Dental  The patient does not have a dental home.   Elimination  Elimination problems do not include constipation.   Behavioral  Disciplinary methods include consistency among caregivers.   Sleep  The patient does not snore. There are no sleep problems.   Safety  Home is child-proofed? yes. There is no smoking in the home. Home has working smoke alarms? yes. Home has working carbon monoxide alarms? yes. There is no gun in home. There is an appropriate car seat in use.   Screening  Immunizations are up-to-date. There are no risk factors for hearing  "loss. There are no risk factors for anemia. There are no risk factors for tuberculosis. There are no risk factors for lead toxicity.   Social  The caregiver enjoys the child. Childcare is provided at child's home. The childcare provider is a parent.     BP (!) 114/75   Pulse 99   Ht 1.105 m (3' 7.5\")   Wt (!) 36.5 kg   SpO2 97%   BMI 29.87 kg/m²     Objective   Growth parameters are noted and are appropriate for age.  Physical Exam  Vitals and nursing note reviewed.   Constitutional:       General: He is active. He is not in acute distress.     Appearance: He is well-developed.   HENT:      Head: Normocephalic.      Right Ear: Tympanic membrane and ear canal normal.      Left Ear: Tympanic membrane and ear canal normal.      Nose: Nose normal.      Mouth/Throat:      Mouth: Mucous membranes are moist.      Pharynx: Oropharynx is clear.   Eyes:      Extraocular Movements: Extraocular movements intact.      Conjunctiva/sclera: Conjunctivae normal.      Pupils: Pupils are equal, round, and reactive to light.   Cardiovascular:      Rate and Rhythm: Normal rate and regular rhythm.      Heart sounds: Normal heart sounds, S1 normal and S2 normal. No murmur heard.  Pulmonary:      Effort: Pulmonary effort is normal. No respiratory distress.      Breath sounds: Normal breath sounds.   Abdominal:      General: Abdomen is flat. Bowel sounds are normal.      Palpations: Abdomen is soft.      Tenderness: There is no abdominal tenderness.   Musculoskeletal:         General: Normal range of motion.      Cervical back: Normal range of motion.   Skin:     General: Skin is warm and dry.      Findings: No rash.   Neurological:      General: No focal deficit present.      Mental Status: He is alert and oriented for age.   Psychiatric:         Attention and Perception: Attention normal.         Speech: Speech normal.         Behavior: Behavior normal.         Assessment/Plan   Healthy 2 y.o. male child. Check weight gain labs. Will " call with results.  Vision screen not working.  1. Anticipatory guidance discussed.  Gave handout on well-child issues at this age.  2.  Weight management:  The patient was counseled regarding nutrition and physical activity.  3. Development: appropriate for age  4. Primary water source has adequate fluoride: yes  5.   Orders Placed This Encounter   Procedures    Fluoride Application    Comprehensive metabolic panel    Hemoglobin A1C    TSH with reflex to Free T4 if abnormal    Reticulocytes    Ferritin    Iron and TIBC    CBC    Lead, Venous    Referral to Pediatric ENT     6. Follow-up visit in 1 year for next well child visit, or sooner as needed.   No

## 2024-11-21 ENCOUNTER — APPOINTMENT (OUTPATIENT)
Dept: ANTEPARTUM | Facility: CLINIC | Age: 29
End: 2024-11-21

## 2024-11-21 ENCOUNTER — ASOB RESULT (OUTPATIENT)
Age: 29
End: 2024-11-21

## 2024-11-21 PROCEDURE — 76801 OB US < 14 WKS SINGLE FETUS: CPT | Mod: NC

## 2024-11-21 PROCEDURE — 76813 OB US NUCHAL MEAS 1 GEST: CPT

## 2024-11-22 ENCOUNTER — APPOINTMENT (OUTPATIENT)
Dept: OBGYN | Facility: CLINIC | Age: 29
End: 2024-11-22
Payer: COMMERCIAL

## 2024-11-22 ENCOUNTER — NON-APPOINTMENT (OUTPATIENT)
Age: 29
End: 2024-11-22

## 2024-11-22 VITALS
OXYGEN SATURATION: 100 % | DIASTOLIC BLOOD PRESSURE: 68 MMHG | WEIGHT: 139 LBS | BODY MASS INDEX: 25.58 KG/M2 | HEIGHT: 62 IN | TEMPERATURE: 98.2 F | HEART RATE: 80 BPM | SYSTOLIC BLOOD PRESSURE: 104 MMHG | RESPIRATION RATE: 18 BRPM

## 2024-11-22 PROCEDURE — 99213 OFFICE O/P EST LOW 20 MIN: CPT

## 2025-01-03 ENCOUNTER — APPOINTMENT (OUTPATIENT)
Dept: OBGYN | Facility: CLINIC | Age: 30
End: 2025-01-03
Payer: COMMERCIAL

## 2025-01-03 ENCOUNTER — NON-APPOINTMENT (OUTPATIENT)
Age: 30
End: 2025-01-03

## 2025-01-03 VITALS
HEART RATE: 93 BPM | WEIGHT: 151 LBS | HEIGHT: 62 IN | SYSTOLIC BLOOD PRESSURE: 111 MMHG | DIASTOLIC BLOOD PRESSURE: 74 MMHG | BODY MASS INDEX: 27.79 KG/M2

## 2025-01-03 DIAGNOSIS — Z34.92 ENCOUNTER FOR SUPERVISION OF NORMAL PREGNANCY, UNSPECIFIED, SECOND TRIMESTER: ICD-10-CM

## 2025-01-03 DIAGNOSIS — N89.8 OTHER SPECIFIED NONINFLAMMATORY DISORDERS OF VAGINA: ICD-10-CM

## 2025-01-03 PROCEDURE — 99213 OFFICE O/P EST LOW 20 MIN: CPT

## 2025-01-03 RX ORDER — TERCONAZOLE 8 MG/G
0.8 CREAM VAGINAL
Qty: 1 | Refills: 0 | Status: ACTIVE | COMMUNITY
Start: 2025-01-03 | End: 1900-01-01

## 2025-01-07 NOTE — ED ADULT NURSE NOTE - NSSEPSISNEWALTERMENTAL_ED_A_ED
Specialty Pharmacy Patient Management Program  Neurology Initial Assessment     Smita Brown is a 35 y.o. female with migraines seen by a Neurology provider and enrolled in the Neurology Patient Management program offered by [facility].  An initial outreach was conducted, including assessment of therapy appropriateness and specialty medication education for Ubrelvy. The patient was introduced to services offered by [facility], including: regular assessments, refill coordination, curbside pick-up or mail order delivery options, prior authorization maintenance, and financial assistance programs as applicable. The patient was also provided with contact information for the pharmacy team.     Insurance Coverage & Financial Support  [employer], Ubrelvy co-pay card    Relevant Past Medical History and Comorbidities  Relevant medical history and concomitant health conditions were discussed with the patient. The patient's chart has been reviewed for relevant past medical history and comorbid health conditions and updated as necessary.   Past Medical History:   Diagnosis Date    Abnormal Pap smear of cervix     repeat ok    Cluster headache     Migraine     Thumb fracture     concussions     Social History     Socioeconomic History    Marital status:    Tobacco Use    Smoking status: Never     Passive exposure: Never    Smokeless tobacco: Never   Vaping Use    Vaping status: Never Used   Substance and Sexual Activity    Alcohol use: Not Currently     Alcohol/week: 1.0 standard drink of alcohol     Types: 1 Glasses of wine per week     Comment: socially    Drug use: Never    Sexual activity: Yes     Partners: Male     Birth control/protection: Vasectomy     Problem list reviewed by Maria Isabel Manzano, PharmD on 1/7/2025 at 10:16 AM    Allergies  Known allergies and reactions were discussed with the patient. The patient's chart has been reviewed for  allergy  information and updated as necessary.   No Known Allergies  Allergies reviewed by Maria Isabel Manzano, Vandana on 1/7/2025 at 10:16 AM    Relevant Laboratory Values      .     Current Medication List  This medication list has been reviewed with the patient and evaluated for any interactions or necessary modifications/recommendations, and updated to include all prescription medications, OTC medications, and supplements the patient is currently taking.  This list reflects what is contained in the patient's profile, which has also been marked as reviewed to communicate to other providers it is the most up to date version of the patient's current medication therapy.     Current Outpatient Medications:     rizatriptan (MAXALT) 10 MG tablet, , Disp: , Rfl:     ubrogepant (Ubrelvy) 100 MG tablet, Take by oral route for 10 days., Disp: , Rfl:     ubrogepant (Ubrelvy) 100 MG tablet, Take 1 tablet by mouth daily as needed for migraines - Take at onset of headache - May repeat once in 2 hours if needed (max of 200 mg/ 24 hrs), Disp: 16 tablet, Rfl: 11    Medicines reviewed by Maria Isabel Manzano, PharmD on 1/7/2025 at 10:16 AM    Drug Interactions  No relevant drug-drug interactions with specialty medication(s):  Ubrelvy.        Initial Education Provided for Specialty Medication  The patient has been provided with the following education and any applicable administration techniques (i.e. self-injection) have been demonstrated for the therapies indicated. All questions and concerns have been addressed prior to the patient receiving the medication, and the patient has verbalized understanding of the education and any materials provided.  Additional patient education shall be provided and documented upon request by the patient, provider or payer.      Ubrelvy (Ubrogepant)  Medication Expectations   Why am I taking this medication? You are taking this medication to treat acute migraines.   What should I expect while on this medication?  You should expect to see a decrease in the frequency and severity of your migraines.   How does the medication work? Ubrelvy is a monoclonal antibody that binds to calcitonin gene-related peptide (CGRP) and blocks its binding to the receptor decreasing the severity of migraines.   How long will I be on this medication for? The amount of time you will be on this medication will be determined by your doctor and your response to the medication.    How do I take this medication? Take as directed on your prescription label.  Reviewed plan for Ubrelvy 100mg (1 tablet) PO daily prn; may repeat x 1 in 2 hours, if needed. Max dosage = 200mg/24 hours.    What are some possible side effects? Potential side effects including, but not limited to nausea and somnolence. Pt verbalized understanding.   What happens if I miss a dose? This is taken as needed for migraines.     Medication Safety   What are things I should warn my doctor immediately about? Allergic reaction: Itching or hives, swelling in your face or hands, swelling or tingling in your mouth or throat, chest tightness, trouble breathing     What are things that I should be cautious of? Tell your doctor if you are pregnant or breastfeeding, or if you have kidney disease or liver disease.   What are some medications that can interact with this one? Concomitant use of strong CY inhibitors, check with your pharmacist or provider before starting any new medications, avoid grapefruit juice.     Medication Storage/Handling   How should I handle this medication? Keep this medication out of reach of pets/children.   How does this medication need to be stored? Store at a controlled room temperature between 20 and 25 degrees C (68 and 77 degrees F), with excursions permitted between 15 and 30 degrees C (59 and 86 degrees F) away from direct sunlight and moisture.   How should I dispose of this medication? There should not be a need to dispose of this medication unless your  provider decides to change the dose or therapy. If that is the case, take to your local police station for proper disposal. Some pharmacies also have take-back bins for medication drop-off.      Resources/Support   How can I remind myself to take this medication? This is taken as needed for migraines.   Is financial support available?  Yes, "[employer] can provide co-pay cards if you have commercial insurance or patient assistance if you have Medicare or no insurance.    Which vaccines are recommended for me? Talk to your doctor about these vaccines: Flu, Coronavirus (COVID-19), Pneumococcal (pneumonia), Tdap, Hepatitis B, Zoster (shingles)          Adherence and Self-Administration  Adherence related to the patient's specialty therapy was discussed with the patient. The Adherence segment of this outreach has been reviewed and updated.   Is there a concern with patient's ability to self administer the medication correctly and without issue?: No  Were any potential barriers to adherence identified during the initial assessment or patient education?: No  Are there any concerns regarding the patient's understanding of the importance of medication adherence?: No  Methods for Supporting Patient Adherence and/or Self-Administration: n/a    Goals of Therapy  Goals related to the patient's specialty therapy were discussed with the patient. The Patient Goals segment of this outreach has been reviewed and updated.   Goals Addressed Today        Specialty Pharmacy General Goal      On Average, Reduce:   Symptom severity by 90% within 60 min of taking acute therapy.       Date of Reassessment Notes on Progress Toward Above Goals                                                            Reassessment Plan & Follow-Up  Medication Therapy Changes: Continue Ubrelvy 100 mg po once daily as needed for migraines - Take at onset of headache - May repeat x 1 in 2 hours if needed (max of 200 mg/ 24 hrs)  Related Plans,  Therapy Recommendations, or Therapy Problems to Be Addressed: none  Pharmacist to perform regular reassessments no more than (6) months from the previous assessment.  Care Coordinator to set up future refill outreaches, coordinate prescription delivery, and escalate clinical questions to pharmacist.   Welcome information and patient satisfaction survey to be sent by specialty pharmacy team with patient's initial fill.    Attestation  Therapeutic appropriateness: Appropriate   I attest the patient was actively involved in and has agreed to the above plan of care. If the prescribed therapy is at any point deemed not appropriate based on the current or future assessments, a consultation will be initiated with the patient's specialty care provider to determine the best course of action. The revised plan of therapy will be documented along with any additional patient education provided. Discussed aforementioned material with patient via telemedicine.    Maria Isabel Manzano, PharmD, [facility]  Clinic Specialty Pharmacist, Neurology  1/7/2025  10:36 EST   No

## 2025-01-08 LAB
AFP INTERP SERPL-IMP: NORMAL
AFP INTERP SERPL-IMP: NORMAL
AFP MOM CUT-OFF: 2.5
AFP MOM SERPL: 1.2
AFP PERCENTILE: 71.2
AFP SERPL-ACNC: 57.27 NG/ML
CARBAMAZEPINE?: NO
CURRENT SMOKER: NORMAL
DIABETES STATUS PATIENT: NORMAL
GA: NORMAL
GESTATIONAL AGE METHOD: NORMAL
HX OF NTD NARR: NORMAL
MULTIPLE PREGNANCY: NORMAL
NEURAL TUBE DEFECT RISK FETUS: NORMAL
NEURAL TUBE DEFECT RISK POP: NORMAL
RECOM F/U: NO
TEST PERFORMANCE INFO SPEC: NORMAL
VALPROIC ACID?: NORMAL

## 2025-01-17 ENCOUNTER — ASOB RESULT (OUTPATIENT)
Age: 30
End: 2025-01-17

## 2025-01-17 ENCOUNTER — APPOINTMENT (OUTPATIENT)
Dept: ANTEPARTUM | Facility: CLINIC | Age: 30
End: 2025-01-17

## 2025-01-17 PROCEDURE — 76811 OB US DETAILED SNGL FETUS: CPT

## 2025-01-29 ENCOUNTER — NON-APPOINTMENT (OUTPATIENT)
Age: 30
End: 2025-01-29

## 2025-01-31 ENCOUNTER — NON-APPOINTMENT (OUTPATIENT)
Age: 30
End: 2025-01-31

## 2025-01-31 ENCOUNTER — APPOINTMENT (OUTPATIENT)
Dept: OBGYN | Facility: CLINIC | Age: 30
End: 2025-01-31
Payer: COMMERCIAL

## 2025-01-31 VITALS
WEIGHT: 157 LBS | BODY MASS INDEX: 28.89 KG/M2 | HEIGHT: 62 IN | DIASTOLIC BLOOD PRESSURE: 75 MMHG | HEART RATE: 94 BPM | SYSTOLIC BLOOD PRESSURE: 128 MMHG

## 2025-01-31 PROCEDURE — 99213 OFFICE O/P EST LOW 20 MIN: CPT

## 2025-02-14 ENCOUNTER — APPOINTMENT (OUTPATIENT)
Dept: ANTEPARTUM | Facility: CLINIC | Age: 30
End: 2025-02-14
Payer: COMMERCIAL

## 2025-02-14 ENCOUNTER — OUTPATIENT (OUTPATIENT)
Dept: INPATIENT UNIT | Facility: HOSPITAL | Age: 30
LOS: 1 days | Discharge: HOME | End: 2025-02-14
Payer: COMMERCIAL

## 2025-02-14 ENCOUNTER — ASOB RESULT (OUTPATIENT)
Age: 30
End: 2025-02-14

## 2025-02-14 VITALS
HEART RATE: 90 BPM | TEMPERATURE: 98 F | DIASTOLIC BLOOD PRESSURE: 57 MMHG | SYSTOLIC BLOOD PRESSURE: 111 MMHG | RESPIRATION RATE: 16 BRPM

## 2025-02-14 VITALS — TEMPERATURE: 98 F | RESPIRATION RATE: 16 BRPM

## 2025-02-14 DIAGNOSIS — O26.899 OTHER SPECIFIED PREGNANCY RELATED CONDITIONS, UNSPECIFIED TRIMESTER: ICD-10-CM

## 2025-02-14 PROCEDURE — 59025 FETAL NON-STRESS TEST: CPT | Mod: 26

## 2025-02-14 PROCEDURE — 99221 1ST HOSP IP/OBS SF/LOW 40: CPT | Mod: 25

## 2025-02-14 PROCEDURE — 76815 OB US LIMITED FETUS(S): CPT | Mod: 26

## 2025-02-14 RX ORDER — PNV WITH CALCIUM NO.11/IRON/FA 65 MG-1 MG
1 TABLET ORAL
Refills: 0 | DISCHARGE

## 2025-02-14 NOTE — OB PROVIDER TRIAGE NOTE - NSHPPHYSICALEXAM_GEN_ALL_CORE
T(C): 36.9 (02-14-25 @ 17:37), Max: 36.9 (02-14-25 @ 17:21)  HR: 90 (02-14-25 @ 17:37) (90 - 90)  BP: 111/57 (02-14-25 @ 17:37) (102/51 - 111/57)  RR: 16 (02-14-25 @ 17:37) (16 - 16)    Physical Exam  Gen: A&Ox3, NAD  Cardiac: well perfused  Pulm: Unlabored, breathing comfortably on room air  Abdomen: soft, nontender, gravid    TAUS: cephalic, posterior placenta, MVP 5.4,  bpm via m-mode, images saved in ASOB  EFM: 145 bpm, moderate variability, +accels, no decels, reactive NST  Searcy: no contractions

## 2025-02-14 NOTE — OB RN TRIAGE NOTE - FALL HARM RISK - UNIVERSAL INTERVENTIONS
Bed in lowest position, wheels locked, appropriate side rails in place/Call bell, personal items and telephone in reach/Instruct patient to call for assistance before getting out of bed or chair/Non-slip footwear when patient is out of bed/[place] to call system/Physically safe environment - no spills, clutter or unnecessary equipment/Purposeful Proactive Rounding/Room/bathroom lighting operational, light cord in reach

## 2025-02-14 NOTE — OB PROVIDER TRIAGE NOTE - ADDITIONAL INSTRUCTIONS
Follow up with Dr. Rosales in office at next scheduled prenatal appointment. Continue to eat a regular diet and drink plenty of fluid. Return to hospital if you experience cramping, contractions, leaking of vaginal fluid, vaginal bleeding, or a decrease/absence of your baby's movements.

## 2025-02-14 NOTE — OB PROVIDER TRIAGE NOTE - HISTORY OF PRESENT ILLNESS
29 y.o  @ 24w3d presents to hospital with c/o decreased fetal movement for the past 2 weeks, stating it has felt less for the past week. She denies contractions, cramping, VB, LOF, SOB, chest pain.     Prenatal care with Dr. Rosales  -Anatomy scan wnl on 25

## 2025-02-14 NOTE — OB PROVIDER TRIAGE NOTE - NSOBPROVIDERNOTE_OBGYN_ALL_OB_FT
29 y.o  @ 24w3d presenting for decreased fetal movement x2 weeks    -Fetal status reassuring  -Patient now endorsing fetal movement  -No further OB complaints  -Discharged home and will follow up with Dr. Rosales at next scheduled prenatal appointment.  -Written and verbal discharge instructions and  labor precautions provided to patient.    d/w Dr. Bobby Gómez [facility]

## 2025-02-15 PROBLEM — Z78.9 OTHER SPECIFIED HEALTH STATUS: Chronic | Status: INACTIVE | Noted: 2024-10-26 | Resolved: 2025-02-14

## 2025-02-17 DIAGNOSIS — Z3A.24 24 WEEKS GESTATION OF PREGNANCY: ICD-10-CM

## 2025-02-17 DIAGNOSIS — O36.8120 DECREASED FETAL MOVEMENTS, SECOND TRIMESTER, NOT APPLICABLE OR UNSPECIFIED: ICD-10-CM

## 2025-02-26 ENCOUNTER — NON-APPOINTMENT (OUTPATIENT)
Age: 30
End: 2025-02-26

## 2025-02-26 DIAGNOSIS — Z34.92 ENCOUNTER FOR SUPERVISION OF NORMAL PREGNANCY, UNSPECIFIED, SECOND TRIMESTER: ICD-10-CM

## 2025-02-27 ENCOUNTER — APPOINTMENT (OUTPATIENT)
Dept: OBGYN | Facility: CLINIC | Age: 30
End: 2025-02-27
Payer: COMMERCIAL

## 2025-02-27 VITALS
DIASTOLIC BLOOD PRESSURE: 75 MMHG | HEART RATE: 92 BPM | BODY MASS INDEX: 29.44 KG/M2 | HEIGHT: 62 IN | SYSTOLIC BLOOD PRESSURE: 124 MMHG | WEIGHT: 160 LBS

## 2025-02-27 PROCEDURE — 99213 OFFICE O/P EST LOW 20 MIN: CPT

## 2025-03-02 LAB
BASOPHILS # BLD AUTO: 0.02 K/UL
BASOPHILS NFR BLD AUTO: 0.2 %
EOSINOPHIL # BLD AUTO: 0.09 K/UL
EOSINOPHIL NFR BLD AUTO: 0.7 %
GLUCOSE 1H P 50 G GLC PO SERPL-MCNC: 142 MG/DL
HCT VFR BLD CALC: 32.6 %
HGB BLD-MCNC: 10.7 G/DL
IMM GRANULOCYTES NFR BLD AUTO: 0.9 %
LYMPHOCYTES # BLD AUTO: 1.78 K/UL
LYMPHOCYTES NFR BLD AUTO: 14 %
MAN DIFF?: NORMAL
MCHC RBC-ENTMCNC: 26.7 PG
MCHC RBC-ENTMCNC: 32.8 G/DL
MCV RBC AUTO: 81.3 FL
MONOCYTES # BLD AUTO: 0.66 K/UL
MONOCYTES NFR BLD AUTO: 5.2 %
NEUTROPHILS # BLD AUTO: 10.02 K/UL
NEUTROPHILS NFR BLD AUTO: 79 %
PLATELET # BLD AUTO: 244 K/UL
RBC # BLD: 4.01 M/UL
RBC # FLD: 14.5 %
T PALLIDUM AB SER QL IA: NEGATIVE
WBC # FLD AUTO: 12.68 K/UL

## 2025-03-03 DIAGNOSIS — R73.09 OTHER ABNORMAL GLUCOSE: ICD-10-CM

## 2025-03-13 ENCOUNTER — APPOINTMENT (OUTPATIENT)
Dept: ANTEPARTUM | Facility: CLINIC | Age: 30
End: 2025-03-13

## 2025-03-13 ENCOUNTER — OUTPATIENT (OUTPATIENT)
Dept: INPATIENT UNIT | Facility: HOSPITAL | Age: 30
LOS: 1 days | Discharge: HOME | End: 2025-03-13

## 2025-03-13 VITALS
SYSTOLIC BLOOD PRESSURE: 109 MMHG | RESPIRATION RATE: 16 BRPM | HEART RATE: 78 BPM | DIASTOLIC BLOOD PRESSURE: 66 MMHG | TEMPERATURE: 98 F

## 2025-03-13 VITALS — TEMPERATURE: 98 F

## 2025-03-13 DIAGNOSIS — O26.899 OTHER SPECIFIED PREGNANCY RELATED CONDITIONS, UNSPECIFIED TRIMESTER: ICD-10-CM

## 2025-03-13 PROCEDURE — 99212 OFFICE O/P EST SF 10 MIN: CPT

## 2025-03-13 RX ORDER — FOLIC ACID 1 MG/1
1 TABLET ORAL
Refills: 0 | DISCHARGE

## 2025-03-13 NOTE — OB PROVIDER TRIAGE NOTE - ADDITIONAL INSTRUCTIONS
Please return to the hospital if you have vaginal bleeding, leakage of fluid, abdominal pain, or decreased fetal movement.  Please keep all of you upcoming appointments.

## 2025-03-13 NOTE — OB RN TRIAGE NOTE - FALL HARM RISK - UNIVERSAL INTERVENTIONS
Bed in lowest position, wheels locked, appropriate side rails in place/Call bell, personal items and telephone in reach/Instruct patient to call for assistance before getting out of bed or chair/Non-slip footwear when patient is out of bed/[place] to call system/Physically safe environment - no spills, clutter or unnecessary equipment/Purposeful Proactive Rounding/Room/bathroom lighting operational, light cord in reach

## 2025-03-13 NOTE — OB PROVIDER TRIAGE NOTE - NSOBPROVIDERNOTE_OBGYN_ALL_OB_FT
30 yo  @ 28.2wga p/w decreased FM.    1.  FHT reactive, MVP 5.12cm, BPP 8/8  2.  Pt cleared for discharge home, VB/PTL/PPROM/dec FM precautions given, pt advised to keep all scheduled appointments  3.  Above plan d/w Dr. Osborne    Discharged at 11:25pm     Yazmin Bhatt MD

## 2025-03-13 NOTE — OB PROVIDER TRIAGE NOTE - HISTORY OF PRESENT ILLNESS
28 yo  @ 28.2wga p/w dec FM x 3 days.  Pt denies VB, LOF, ctxs.  Pt appreciates FM while in Triage.  Pt reports elevated 1 hr GCT with normal GTT.  Pt states otherwise pregnancy has been unremarkable.      NST  Baseline  (       145   ) BPM  Variability (x  )  Moderate   (  ) Minimal  (  ) Absent  (  )  Marked  Accelerations (  ) 15x15   ( x ) 10x10  (  ) no  Decelerations (x  ) no  (  ) Variable  (  ) Early  (  ) Late      Description _________  Contractions (x  ) no  (  ) yes     Description  __________  Interpretation (  x) reactive   (  )  non-reactive      Bedside USN: cephalic, posterior placenta, MVP 5.12cm, BPP 8/8    GYN hx TOPx 1 with D+C  PMH: Denies  PSH: D+C  Meds: PNV, folic acid  NKDA  Social: denies tob/etoh/drug use

## 2025-03-13 NOTE — OB RN TRIAGE NOTE - COMFORT/ACCEPTABLE PAIN LEVEL (0-10)
[facility]  Pediatric Cardiology  Progress Note    Patient Name: Demond Villeda  MRN: [id number]  Admission Date: 7/6/2017  Hospital Length of Stay: 4 days  Code Status: Full Code   Attending Physician: Torres Concepcion MD   Primary Care Physician: Antione Vitale MD  Expected Discharge Date: 7/14/2017  Principal Problem:S/P aortic valve replacement    Subjective:     Interval History: PO intake improved, some desats overnight with sleep    Objective:     Vital Signs (Most Recent):  Temp: 98.1 °F (36.7 °C) (07/10/17 0400)  Pulse: 84 (07/10/17 0600)  Resp: 20 (07/10/17 0600)  BP: 107/60 (07/10/17 0700)  SpO2: 100 % (07/10/17 0600) Vital Signs (24h Range):  Temp:  [98.1 °F (36.7 °C)-99.1 °F (37.3 °C)] 98.1 °F (36.7 °C)  Pulse:  [81-99] 84  Resp:  [12-31] 20  SpO2:  [92 %-100 %] 100 %  BP: ()/(53-69) 107/60  Arterial Line BP: (116)/(61) 116/61     Weight: 64.7 kg (142 lb 10.2 oz)  Body mass index is 27.1 kg/m².     SpO2: 100 %  O2 Device (Oxygen Therapy): nasal cannula w/ humidification    Intake/Output - Last 3 Shifts       07/08 0700 - 07/09 0659 07/09 0700 - 07/10 0659 07/10 0700 - 07/11 0659    P.O. 840 1890     I.V. (mL/kg) 1019 (15.3) 533.7 (8.2) 21 (0.3)    Blood 296.3      IV Piggyback 525 695     Total Intake(mL/kg) 2680.2 (40.4) 3118.7 (48.2) 21 (0.3)    Urine (mL/kg/hr) 2620 (1.6) 4400 (2.8)     Drains       Chest Tube 172 (0.1) 150 (0.1)     Total Output 2792 4550      Net -111.8 -1431.3 +21           Urine Occurrence 1 x            Lines/Drains/Airways     Central Venous Catheter Line                 Percutaneous Central Line Insertion/Assessment - triple lumen  07/06/17 0843 right internal jugular 4 days          Drain                 Chest Tube 07/06/17 1556 1 Right Pleural 19 Fr. 3 days         Chest Tube 07/06/17 1556 2 Left Pleural 19 Fr. 3 days         Chest Tube 07/06/17 1557 3 Left Mediastinal 19 Fr. 3 days          Peripheral Intravenous Line                  Peripheral IV - Single Lumen 07/06/17 0718 Left Hand 4 days         Peripheral IV - Single Lumen 07/06/17 0758 Left Forearm 4 days                Scheduled Medications:    famotidine (PF)  20 mg Intravenous BID    furosemide  20 mg Intravenous Q8H    magnesium gluconate  27 mg Oral BID    metoprolol tartrate  12.5 mg Oral BID    warfarin  3 mg Oral Daily    warfarin  3 mg Oral Daily       Continuous Medications:    heparin (porcine) in 5 % dex 18 Units/kg/hr (07/10/17 0700)    heparin(porcine) in 0.45% NaCl 3 Units/hr (07/09/17 1127)    heparin(porcine) in 0.45% NaCl 3 Units/hr (07/10/17 0800)    heparin(porcine) in 0.45% NaCl 3 Units/hr (07/10/17 0800)    potassium chloride 20 mEq (07/07/17 0302)       PRN Medications: calcium chloride, heparin, porcine (PF), magnesium sulfate in water, morphine, ondansetron, oxycodone, oxycodone, potassium chloride, potassium chloride, sodium bicarbonate, white petrolatum-mineral oil    Physical Exam   Constitutional: She appears well-developed and well-nourished.   Eyes: Conjunctivae are normal. Pupils are equal, round, and reactive to light.   Neck: Neck supple.   Webbed   Cardiovascular: Normal rate, regular rhythm and S1 normal.  Exam reveals no gallop and no friction rub.    Murmur heard.   Systolic murmur is present with a grade of 2/6   Pulses:       Radial pulses are 2+ on the right side, and 2+ on the left side.        Dorsalis pedis pulses are 1+ on the right side, and 1+ on the left side.   Mechanical, loud S2   Pulmonary/Chest: No respiratory distress.   Sternotomy incision with dressing in place. Chest tubes in place   Abdominal: Soft. She exhibits no distension. Bowel sounds are decreased. Hepatosplenomegaly: Liver palpated 1 cm below the RCM.   Musculoskeletal: She exhibits no edema.   Neurological: She exhibits normal muscle tone.   Skin: Skin is warm and dry. Capillary refill takes less than 2 seconds.       Significant Labs:   ABG    Recent Labs  Lab  07/10/17  0302   PH 7.437   PO2 40   PCO2 52.0*   HCO3 35.0*   BE 11     Lab Results   Component Value Date    WBC 4.14 (L) 07/10/2017    HGB 12.0 07/10/2017    HCT 34.2 (L) 07/10/2017    MCV 88 07/10/2017    PLT 95 (L) 07/10/2017     CMP  Sodium   Date Value Ref Range Status   07/10/2017 139 136 - 145 mmol/L Final     Potassium   Date Value Ref Range Status   07/10/2017 3.7 3.5 - 5.1 mmol/L Final     Chloride   Date Value Ref Range Status   07/10/2017 98 95 - 110 mmol/L Final     CO2   Date Value Ref Range Status   07/10/2017 31 (H) 23 - 29 mmol/L Final     Glucose   Date Value Ref Range Status   07/10/2017 97 70 - 110 mg/dL Final     BUN, Bld   Date Value Ref Range Status   07/10/2017 13 5 - 18 mg/dL Final     Creatinine   Date Value Ref Range Status   07/10/2017 0.6 0.5 - 1.4 mg/dL Final     Calcium   Date Value Ref Range Status   07/10/2017 9.2 8.7 - 10.5 mg/dL Final     Total Protein   Date Value Ref Range Status   07/10/2017 6.2 6.0 - 8.4 g/dL Final     Albumin   Date Value Ref Range Status   07/10/2017 3.0 (L) 3.2 - 4.7 g/dL Final     Total Bilirubin   Date Value Ref Range Status   07/10/2017 1.2 (H) 0.1 - 1.0 mg/dL Final     Comment:     For infants and newborns, interpretation of results should be based  on gestational age, weight and in agreement with clinical  observations.  Premature Infant recommended reference ranges:  Up to 24 hours.............<8.0 mg/dL  Up to 48 hours............<12.0 mg/dL  3-5 days..................<15.0 mg/dL  6-29 days.................<15.0 mg/dL       Alkaline Phosphatase   Date Value Ref Range Status   07/10/2017 167 74 - 390 U/L Final     AST   Date Value Ref Range Status   07/10/2017 121 (H) 10 - 40 U/L Final     ALT   Date Value Ref Range Status   07/10/2017 109 (H) 10 - 44 U/L Final     Anion Gap   Date Value Ref Range Status   07/10/2017 10 8 - 16 mmol/L Final     eGFR if    Date Value Ref Range Status   07/10/2017 SEE COMMENT >60 mL/min/1.73 m^2 Final      eGFR if non    Date Value Ref Range Status   07/10/2017 SEE COMMENT >60 mL/min/1.73 m^2 Final     Comment:     Calculation used to obtain the estimated glomerular filtration  rate (eGFR) is the CKD-EPI equation. Since race is unknown   in our information system, the eGFR values for   -American and Non--American patients are given   for each creatinine result.  Test not performed.  GFR calculation is only valid for patients   18 and older.         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 Jace mechanical aortic valve (7/6/17). She is currently critically ill with postoperative respiratory failure on mechanical ventilation on multiple vasoactive infusion for blood pressure control in the setting of extensive aortic surgery.    Plans:  Neuro/Pain:  - ibuprofen prn, oxycodone and morphine prn  Resp  - CT in place with risk of chylous drainage with Sanders's so place to water seal and encourage po  - intermittent need for NC oxygen  Cardiovascular:  - Monitor BP's closely, goal SBP <120 mmHg  - Continue metoprolol 12.5 mg BID  - Monitor telemetry  - lasix 20 mg IV q 8  FEN/GI:  - regular diet, will give ensure to assess chest tube drainage  - Monitor electrolytes and replace as needed.   Renal:  - Monitor I/O's closely  - Continue lasix, goal negative given effusions  Heme/ID:  - s/p Ancef x 48 hours post-op  - Received pRBCs 7/8  - Continue coumadin, increase to 5mg daily today and monitor INR, goal 2-3.  Continue heparin drip.   Plastics:  - R IJ, arterial line, wires, chest tubes   Disposition:  - feed before d/c chest tubes, INR to goal to d/c heparin            Monique Berman MD  Pediatric Cardiology  [facility]     5

## 2025-03-14 DIAGNOSIS — O36.8130 DECREASED FETAL MOVEMENTS, THIRD TRIMESTER, NOT APPLICABLE OR UNSPECIFIED: ICD-10-CM

## 2025-03-14 DIAGNOSIS — Z3A.28 28 WEEKS GESTATION OF PREGNANCY: ICD-10-CM

## 2025-03-20 ENCOUNTER — TRANSCRIPTION ENCOUNTER (OUTPATIENT)
Age: 30
End: 2025-03-20

## 2025-03-21 ENCOUNTER — ASOB RESULT (OUTPATIENT)
Age: 30
End: 2025-03-21

## 2025-03-21 ENCOUNTER — NON-APPOINTMENT (OUTPATIENT)
Age: 30
End: 2025-03-21

## 2025-03-21 ENCOUNTER — APPOINTMENT (OUTPATIENT)
Dept: ANTEPARTUM | Facility: CLINIC | Age: 30
End: 2025-03-21

## 2025-03-21 ENCOUNTER — APPOINTMENT (OUTPATIENT)
Dept: ANTEPARTUM | Facility: CLINIC | Age: 30
End: 2025-03-21
Payer: COMMERCIAL

## 2025-03-21 PROCEDURE — 76818 FETAL BIOPHYS PROFILE W/NST: CPT

## 2025-03-21 PROCEDURE — 76816 OB US FOLLOW-UP PER FETUS: CPT

## 2025-03-27 ENCOUNTER — NON-APPOINTMENT (OUTPATIENT)
Age: 30
End: 2025-03-27

## 2025-03-28 ENCOUNTER — NON-APPOINTMENT (OUTPATIENT)
Age: 30
End: 2025-03-28

## 2025-03-28 ENCOUNTER — APPOINTMENT (OUTPATIENT)
Dept: OBGYN | Facility: CLINIC | Age: 30
End: 2025-03-28
Payer: COMMERCIAL

## 2025-03-28 VITALS
HEIGHT: 62 IN | DIASTOLIC BLOOD PRESSURE: 73 MMHG | WEIGHT: 164 LBS | BODY MASS INDEX: 30.18 KG/M2 | SYSTOLIC BLOOD PRESSURE: 113 MMHG | HEART RATE: 92 BPM

## 2025-03-28 PROCEDURE — 99213 OFFICE O/P EST LOW 20 MIN: CPT

## 2025-04-14 ENCOUNTER — ASOB RESULT (OUTPATIENT)
Age: 30
End: 2025-04-14

## 2025-04-14 ENCOUNTER — APPOINTMENT (OUTPATIENT)
Dept: ANTEPARTUM | Facility: CLINIC | Age: 30
End: 2025-04-14
Payer: COMMERCIAL

## 2025-04-14 ENCOUNTER — OUTPATIENT (OUTPATIENT)
Dept: INPATIENT UNIT | Facility: HOSPITAL | Age: 30
LOS: 1 days | End: 2025-04-14
Payer: COMMERCIAL

## 2025-04-14 VITALS
HEART RATE: 98 BPM | OXYGEN SATURATION: 100 % | TEMPERATURE: 98 F | SYSTOLIC BLOOD PRESSURE: 128 MMHG | RESPIRATION RATE: 16 BRPM | DIASTOLIC BLOOD PRESSURE: 53 MMHG

## 2025-04-14 VITALS — DIASTOLIC BLOOD PRESSURE: 58 MMHG | SYSTOLIC BLOOD PRESSURE: 99 MMHG | HEART RATE: 74 BPM

## 2025-04-14 DIAGNOSIS — Z33.2 ENCOUNTER FOR ELECTIVE TERMINATION OF PREGNANCY: Chronic | ICD-10-CM

## 2025-04-14 DIAGNOSIS — O26.899 OTHER SPECIFIED PREGNANCY RELATED CONDITIONS, UNSPECIFIED TRIMESTER: ICD-10-CM

## 2025-04-14 LAB
APPEARANCE UR: CLEAR — SIGNIFICANT CHANGE UP
BILIRUB UR-MCNC: NEGATIVE — SIGNIFICANT CHANGE UP
COLOR SPEC: YELLOW — SIGNIFICANT CHANGE UP
DIFF PNL FLD: NEGATIVE — SIGNIFICANT CHANGE UP
GLUCOSE UR QL: NEGATIVE MG/DL — SIGNIFICANT CHANGE UP
KETONES UR-MCNC: NEGATIVE MG/DL — SIGNIFICANT CHANGE UP
LEUKOCYTE ESTERASE UR-ACNC: NEGATIVE — SIGNIFICANT CHANGE UP
NITRITE UR-MCNC: NEGATIVE — SIGNIFICANT CHANGE UP
PH UR: 6.5 — SIGNIFICANT CHANGE UP (ref 5–8)
PROT UR-MCNC: NEGATIVE MG/DL — SIGNIFICANT CHANGE UP
SP GR SPEC: 1.01 — SIGNIFICANT CHANGE UP (ref 1–1.03)
UROBILINOGEN FLD QL: 0.2 MG/DL — SIGNIFICANT CHANGE UP (ref 0.2–1)

## 2025-04-14 PROCEDURE — 83986 ASSAY PH BODY FLUID NOS: CPT | Mod: QW

## 2025-04-14 PROCEDURE — 76819 FETAL BIOPHYS PROFIL W/O NST: CPT | Mod: 26

## 2025-04-14 PROCEDURE — 59025 FETAL NON-STRESS TEST: CPT | Mod: 26

## 2025-04-14 PROCEDURE — 99221 1ST HOSP IP/OBS SF/LOW 40: CPT | Mod: 25

## 2025-04-14 NOTE — OB PROVIDER TRIAGE NOTE - NSOBPROVIDERNOTE_OBGYN_ALL_OB_FT
28yo female  @ 32.6 wks SLIUP uncomp PNC here complaining of decreased FM with ctx's and loss of mucous plug  -SSE is negative for rupture  -TVS reveals long cervix  -UA ordered 28yo female  @ 32.6 wks SLIUP uncomp PNC here complaining of decreased FM with ctx's and loss of mucous plug  -SSE is negative for rupture  -TVS reveals long cervix  -pt unable to give urine sample for UA  -no evidence of PTL at this time  -pt reports +FM at time of NST; BPP is 8/8  -pt was discussed with Dr Donis and pt was cleared for discharge home with instructions at 1418  - Please follow up with your obstetrician at your next scheduled appointment on 4/22/2025  - Please return for decreased/no fetal movement, vaginal bleeding similar to that of a period, leaking/gush of fluid, regular contractions or requiring pain medication   - Patient and partner and educated of plan and demonstrate understanding. All questions answered. Discharge instructions provided and signed. 30yo female  @ 32.6 wks SLIUP uncomp PNC here complaining of decreased FM with ctx's and loss of mucous plug  -SSE is negative for rupture  -TVS reveals long cervix  -UA neg for UTI  -no evidence of PTL at this time  -pt reports +FM at time of NST; BPP is 8/8  -pt was discussed with Dr Donis and pt was cleared for discharge home with instructions at 1418  - Please follow up with your obstetrician at your next scheduled appointment on 4/22/2025  - Please return for decreased/no fetal movement, vaginal bleeding similar to that of a period, leaking/gush of fluid, regular contractions or requiring pain medication   - Patient and partner and educated of plan and demonstrate understanding. All questions answered. Discharge instructions provided and signed.

## 2025-04-14 NOTE — OB RN TRIAGE NOTE - NSDCHEARTRATE_OBGYN_A_OB_NU
Chronic condition.  Patient currently taking losartan 50 mg daily and metoprolol 25 mg daily.  Blood pressure has been running on the high side recently.  Patient asymptomatic   74

## 2025-04-14 NOTE — OB PROVIDER TRIAGE NOTE - NSHPLABSRESULTS_GEN_ALL_CORE
Urinalysis Basic - ( 2025 14:25 )    Color: Yellow / Appearance: Clear / S.013 / pH: x  Gluc: x / Ketone: Negative mg/dL  / Bili: Negative / Urobili: 0.2 mg/dL   Blood: x / Protein: Negative mg/dL / Nitrite: Negative   Leuk Esterase: Negative / RBC: x / WBC x   Sq Epi: x / Non Sq Epi: x / Bacteria: x

## 2025-04-14 NOTE — OB PROVIDER TRIAGE NOTE - HISTORY OF PRESENT ILLNESS
28yo female  @ 30.6 wks SLIUP uncomp PNC here complaining of decreased FM. Pt also complaining of ctx's Q10-15 min with loss of mucous plug few days ago. Pt denies hematuria or dysuria/ urgency or frequency.    PNC with Dr JACQUELIN Rosales  PNC uncomplicated
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
## 2025-04-14 NOTE — OB PROVIDER TRIAGE NOTE - NSHPPHYSICALEXAM_GEN_ALL_CORE
ICU Vital Signs Last 24 Hrs  T(C): 36.6 (14 Apr 2025 11:56), Max: 36.6 (14 Apr 2025 11:56)  T(F): 97.9 (14 Apr 2025 11:56), Max: 97.9 (14 Apr 2025 11:56)  HR: 77 (14 Apr 2025 12:27) (77 - 98)  BP: 96/51 (14 Apr 2025 12:27) (96/51 - 128/53)  BP(mean): --  ABP: --  ABP(mean): --  RR: 16 (14 Apr 2025 11:56) (16 - 16)  SpO2: 100% (14 Apr 2025 11:56) (100% - 100%)    O2 Parameters below as of 14 Apr 2025 11:56  Patient On (Oxygen Delivery Method): room air    General: Female sitting comfortably   Neuro: No facial asymmetry, no slurred speech, moves all 4 extremities  Mood: Alert and lucid, appropriate mood and affect  Head: Normocephalic. Atraumatic.   Eyes: No discharge, lids normal, conjunctiva normal  Lungs: No respiratory distress  Abdomen: Soft, nontender. Gravid. No contractions on palpation    NST  Baseline  (   135       ) BPM  Variability ( x )  Moderate   (  ) Minimal  (  ) Absent  (  )  Marked  Accelerations ( x ) 15x15   (  ) 10x10  (  ) no  Decelerations (x  ) no  (  ) Variable  (  ) Early  (  ) Late      Description _________  Contractions (  ) no  ( x  ) yes     Description Q4-6 min __________  Interpretation ( x ) reactive   (  )  non-reactive    SSE- os appears closed; Negative pooling/ negative Nitrazine/ negative ferning  TVS- Images and report in ASOB- 4.76-5.13 cm; no funnel/dynamic changes  Abd sono- Images and report in ASOB- ICU Vital Signs Last 24 Hrs  T(C): 36.6 (14 Apr 2025 11:56), Max: 36.6 (14 Apr 2025 11:56)  T(F): 97.9 (14 Apr 2025 11:56), Max: 97.9 (14 Apr 2025 11:56)  HR: 77 (14 Apr 2025 12:27) (77 - 98)  BP: 96/51 (14 Apr 2025 12:27) (96/51 - 128/53)  BP(mean): --  ABP: --  ABP(mean): --  RR: 16 (14 Apr 2025 11:56) (16 - 16)  SpO2: 100% (14 Apr 2025 11:56) (100% - 100%)    O2 Parameters below as of 14 Apr 2025 11:56  Patient On (Oxygen Delivery Method): room air    General: Female sitting comfortably   Neuro: No facial asymmetry, no slurred speech, moves all 4 extremities  Mood: Alert and lucid, appropriate mood and affect  Head: Normocephalic. Atraumatic.   Eyes: No discharge, lids normal, conjunctiva normal  Lungs: No respiratory distress  Abdomen: Soft, nontender. Gravid. No contractions on palpation    NST  Baseline  (   135       ) BPM  Variability ( x )  Moderate   (  ) Minimal  (  ) Absent  (  )  Marked  Accelerations ( x ) 15x15   (  ) 10x10  (  ) no  Decelerations (x  ) no  (  ) Variable  (  ) Early  (  ) Late      Description _________  Contractions (  ) no  ( x  ) yes     Description Q4-6 min __________  Interpretation ( x ) reactive   (  )  non-reactive    SSE- os appears closed; Negative pooling/ negative Nitrazine/ negative ferning  TVS- Images and report in ASOB- 4.76-5.13 cm; no funnel/dynamic changes  Abd sono- Images and report in ASOB- cephalic; fundal placenta; HERIBERTO-11.14; 8/8 BPP  Pt endorses FM at time of NST

## 2025-04-15 ENCOUNTER — APPOINTMENT (OUTPATIENT)
Dept: OBGYN | Facility: CLINIC | Age: 30
End: 2025-04-15

## 2025-04-16 DIAGNOSIS — Z3A.32 32 WEEKS GESTATION OF PREGNANCY: ICD-10-CM

## 2025-04-16 DIAGNOSIS — N89.8 OTHER SPECIFIED NONINFLAMMATORY DISORDERS OF VAGINA: ICD-10-CM

## 2025-04-16 DIAGNOSIS — O36.8130 DECREASED FETAL MOVEMENTS, THIRD TRIMESTER, NOT APPLICABLE OR UNSPECIFIED: ICD-10-CM

## 2025-04-16 DIAGNOSIS — N83.209 UNSPECIFIED OVARIAN CYST, UNSPECIFIED SIDE: ICD-10-CM

## 2025-04-16 DIAGNOSIS — O99.891 OTHER SPECIFIED DISEASES AND CONDITIONS COMPLICATING PREGNANCY: ICD-10-CM

## 2025-04-16 DIAGNOSIS — O47.03 FALSE LABOR BEFORE 37 COMPLETED WEEKS OF GESTATION, THIRD TRIMESTER: ICD-10-CM

## 2025-04-16 DIAGNOSIS — O34.83 MATERNAL CARE FOR OTHER ABNORMALITIES OF PELVIC ORGANS, THIRD TRIMESTER: ICD-10-CM

## 2025-04-18 ENCOUNTER — APPOINTMENT (OUTPATIENT)
Dept: OBGYN | Facility: CLINIC | Age: 30
End: 2025-04-18

## 2025-04-24 ENCOUNTER — NON-APPOINTMENT (OUTPATIENT)
Age: 30
End: 2025-04-24

## 2025-04-25 ENCOUNTER — APPOINTMENT (OUTPATIENT)
Dept: OBGYN | Facility: CLINIC | Age: 30
End: 2025-04-25
Payer: COMMERCIAL

## 2025-04-25 VITALS
BODY MASS INDEX: 30.55 KG/M2 | SYSTOLIC BLOOD PRESSURE: 115 MMHG | HEIGHT: 62 IN | DIASTOLIC BLOOD PRESSURE: 77 MMHG | HEART RATE: 94 BPM | WEIGHT: 166 LBS

## 2025-04-25 DIAGNOSIS — O99.019 ANEMIA COMPLICATING PREGNANCY, UNSPECIFIED TRIMESTER: ICD-10-CM

## 2025-04-25 DIAGNOSIS — Z34.93 ENCOUNTER FOR SUPERVISION OF NORMAL PREGNANCY, UNSPECIFIED, THIRD TRIMESTER: ICD-10-CM

## 2025-04-25 PROCEDURE — 99213 OFFICE O/P EST LOW 20 MIN: CPT | Mod: 25

## 2025-04-25 PROCEDURE — 90715 TDAP VACCINE 7 YRS/> IM: CPT

## 2025-04-25 PROCEDURE — 90471 IMMUNIZATION ADMIN: CPT

## 2025-04-25 RX ORDER — CHLORHEXIDINE GLUCONATE 4 %
325 (65 FE) LIQUID (ML) TOPICAL TWICE DAILY
Qty: 60 | Refills: 3 | Status: ACTIVE | COMMUNITY
Start: 2025-04-25 | End: 1900-01-01

## 2025-04-28 LAB
BASOPHILS # BLD AUTO: 0.04 K/UL
BASOPHILS NFR BLD AUTO: 0.3 %
EOSINOPHIL # BLD AUTO: 0.07 K/UL
EOSINOPHIL NFR BLD AUTO: 0.6 %
HCT VFR BLD CALC: 35.6 %
HGB BLD-MCNC: 11 G/DL
IMM GRANULOCYTES NFR BLD AUTO: 0.8 %
LYMPHOCYTES # BLD AUTO: 1.72 K/UL
LYMPHOCYTES NFR BLD AUTO: 13.6 %
MAN DIFF?: NORMAL
MCHC RBC-ENTMCNC: 26 PG
MCHC RBC-ENTMCNC: 30.9 G/DL
MCV RBC AUTO: 84.2 FL
MONOCYTES # BLD AUTO: 0.84 K/UL
MONOCYTES NFR BLD AUTO: 6.7 %
NEUTROPHILS # BLD AUTO: 9.85 K/UL
NEUTROPHILS NFR BLD AUTO: 78 %
PLATELET # BLD AUTO: 247 K/UL
RBC # BLD: 4.23 M/UL
RBC # FLD: 15.3 %
WBC # FLD AUTO: 12.62 K/UL

## 2025-05-06 DIAGNOSIS — F41.9 ANXIETY DISORDER, UNSPECIFIED: ICD-10-CM

## 2025-05-07 ENCOUNTER — ASOB RESULT (OUTPATIENT)
Age: 30
End: 2025-05-07

## 2025-05-07 ENCOUNTER — APPOINTMENT (OUTPATIENT)
Dept: ANTEPARTUM | Facility: CLINIC | Age: 30
End: 2025-05-07
Payer: COMMERCIAL

## 2025-05-07 ENCOUNTER — APPOINTMENT (OUTPATIENT)
Dept: ANTEPARTUM | Facility: CLINIC | Age: 30
End: 2025-05-07

## 2025-05-07 PROCEDURE — 99204 OFFICE O/P NEW MOD 45 MIN: CPT | Mod: 25

## 2025-05-07 PROCEDURE — 76820 UMBILICAL ARTERY ECHO: CPT | Mod: 59

## 2025-05-07 PROCEDURE — 76818 FETAL BIOPHYS PROFILE W/NST: CPT

## 2025-05-07 PROCEDURE — 76816 OB US FOLLOW-UP PER FETUS: CPT

## 2025-05-08 ENCOUNTER — NON-APPOINTMENT (OUTPATIENT)
Age: 30
End: 2025-05-08

## 2025-05-08 DIAGNOSIS — Z34.93 ENCOUNTER FOR SUPERVISION OF NORMAL PREGNANCY, UNSPECIFIED, THIRD TRIMESTER: ICD-10-CM

## 2025-05-09 ENCOUNTER — APPOINTMENT (OUTPATIENT)
Dept: OBGYN | Facility: CLINIC | Age: 30
End: 2025-05-09
Payer: COMMERCIAL

## 2025-05-09 ENCOUNTER — APPOINTMENT (OUTPATIENT)
Dept: ANTEPARTUM | Facility: CLINIC | Age: 30
End: 2025-05-09
Payer: COMMERCIAL

## 2025-05-09 ENCOUNTER — ASOB RESULT (OUTPATIENT)
Age: 30
End: 2025-05-09

## 2025-05-09 VITALS
SYSTOLIC BLOOD PRESSURE: 108 MMHG | HEART RATE: 85 BPM | DIASTOLIC BLOOD PRESSURE: 72 MMHG | WEIGHT: 169 LBS | BODY MASS INDEX: 31.1 KG/M2 | HEIGHT: 62 IN

## 2025-05-09 DIAGNOSIS — O36.5930 MATERNAL CARE FOR OTHER KNOWN OR SUSPECTED POOR FETAL GROWTH, THIRD TRIMESTER, NOT APPLICABLE OR UNSPECIFIED: ICD-10-CM

## 2025-05-09 PROCEDURE — 76820 UMBILICAL ARTERY ECHO: CPT

## 2025-05-09 PROCEDURE — 76819 FETAL BIOPHYS PROFIL W/O NST: CPT

## 2025-05-09 PROCEDURE — 99213 OFFICE O/P EST LOW 20 MIN: CPT

## 2025-05-11 LAB
GP B STREP DNA SPEC QL NAA+PROBE: NOT DETECTED
HIV1+2 AB SPEC QL IA.RAPID: NONREACTIVE
SOURCE GBS: NORMAL

## 2025-05-13 ENCOUNTER — APPOINTMENT (OUTPATIENT)
Dept: ANTEPARTUM | Facility: CLINIC | Age: 30
End: 2025-05-13

## 2025-05-13 ENCOUNTER — ASOB RESULT (OUTPATIENT)
Age: 30
End: 2025-05-13

## 2025-05-16 ENCOUNTER — NON-APPOINTMENT (OUTPATIENT)
Age: 30
End: 2025-05-16

## 2025-05-16 ENCOUNTER — ASOB RESULT (OUTPATIENT)
Age: 30
End: 2025-05-16

## 2025-05-16 ENCOUNTER — APPOINTMENT (OUTPATIENT)
Dept: ANTEPARTUM | Facility: CLINIC | Age: 30
End: 2025-05-16

## 2025-05-16 ENCOUNTER — TRANSCRIPTION ENCOUNTER (OUTPATIENT)
Age: 30
End: 2025-05-16

## 2025-05-16 ENCOUNTER — APPOINTMENT (OUTPATIENT)
Dept: OBGYN | Facility: CLINIC | Age: 30
End: 2025-05-16
Payer: COMMERCIAL

## 2025-05-16 VITALS
DIASTOLIC BLOOD PRESSURE: 78 MMHG | HEIGHT: 62 IN | WEIGHT: 170 LBS | HEART RATE: 90 BPM | BODY MASS INDEX: 31.28 KG/M2 | SYSTOLIC BLOOD PRESSURE: 115 MMHG

## 2025-05-16 PROCEDURE — 76820 UMBILICAL ARTERY ECHO: CPT

## 2025-05-16 PROCEDURE — 76819 FETAL BIOPHYS PROFIL W/O NST: CPT

## 2025-05-16 PROCEDURE — 99213 OFFICE O/P EST LOW 20 MIN: CPT

## 2025-05-20 ENCOUNTER — APPOINTMENT (OUTPATIENT)
Dept: ANTEPARTUM | Facility: CLINIC | Age: 30
End: 2025-05-20

## 2025-05-20 ENCOUNTER — ASOB RESULT (OUTPATIENT)
Age: 30
End: 2025-05-20

## 2025-05-20 PROCEDURE — 76818 FETAL BIOPHYS PROFILE W/NST: CPT

## 2025-05-20 PROCEDURE — 76820 UMBILICAL ARTERY ECHO: CPT | Mod: 59

## 2025-05-20 PROCEDURE — 76816 OB US FOLLOW-UP PER FETUS: CPT

## 2025-05-21 ENCOUNTER — NON-APPOINTMENT (OUTPATIENT)
Age: 30
End: 2025-05-21

## 2025-05-23 ENCOUNTER — NON-APPOINTMENT (OUTPATIENT)
Age: 30
End: 2025-05-23

## 2025-05-23 ENCOUNTER — APPOINTMENT (OUTPATIENT)
Dept: ANTEPARTUM | Facility: CLINIC | Age: 30
End: 2025-05-23

## 2025-05-23 ENCOUNTER — APPOINTMENT (OUTPATIENT)
Dept: OBGYN | Facility: CLINIC | Age: 30
End: 2025-05-23

## 2025-05-23 ENCOUNTER — INPATIENT (INPATIENT)
Facility: HOSPITAL | Age: 30
LOS: 5 days | Discharge: HOME | End: 2025-05-29
Attending: OBSTETRICS & GYNECOLOGY | Admitting: OBSTETRICS & GYNECOLOGY
Payer: COMMERCIAL

## 2025-05-23 DIAGNOSIS — Z33.2 ENCOUNTER FOR ELECTIVE TERMINATION OF PREGNANCY: Chronic | ICD-10-CM

## 2025-05-24 VITALS
TEMPERATURE: 99 F | RESPIRATION RATE: 16 BRPM | DIASTOLIC BLOOD PRESSURE: 72 MMHG | HEART RATE: 85 BPM | WEIGHT: 176.37 LBS | SYSTOLIC BLOOD PRESSURE: 127 MMHG | HEIGHT: 61 IN

## 2025-05-24 PROBLEM — Z78.9 OTHER SPECIFIED HEALTH STATUS: Chronic | Status: INACTIVE | Noted: 2023-08-22 | Resolved: 2025-05-24

## 2025-05-24 LAB
BASOPHILS # BLD AUTO: 0.03 K/UL — SIGNIFICANT CHANGE UP (ref 0–0.2)
BASOPHILS NFR BLD AUTO: 0.2 % — SIGNIFICANT CHANGE UP (ref 0–2)
BLD GP AB SCN SERPL QL: NEGATIVE — SIGNIFICANT CHANGE UP
EOSINOPHIL # BLD AUTO: 0.1 K/UL — SIGNIFICANT CHANGE UP (ref 0–0.5)
EOSINOPHIL NFR BLD AUTO: 0.8 % — SIGNIFICANT CHANGE UP (ref 0–6)
HCT VFR BLD CALC: 37.9 % — SIGNIFICANT CHANGE UP (ref 34.5–45)
HGB BLD-MCNC: 12.4 G/DL — SIGNIFICANT CHANGE UP (ref 11.5–15.5)
IANC: 9.15 K/UL — HIGH (ref 1.8–7.4)
IMM GRANULOCYTES NFR BLD AUTO: 0.7 % — SIGNIFICANT CHANGE UP (ref 0–0.9)
LYMPHOCYTES # BLD AUTO: 16.7 % — SIGNIFICANT CHANGE UP (ref 13–44)
LYMPHOCYTES # BLD AUTO: 2.11 K/UL — SIGNIFICANT CHANGE UP (ref 1–3.3)
MCHC RBC-ENTMCNC: 26.9 PG — LOW (ref 27–34)
MCHC RBC-ENTMCNC: 32.7 G/DL — SIGNIFICANT CHANGE UP (ref 32–36)
MCV RBC AUTO: 82.2 FL — SIGNIFICANT CHANGE UP (ref 80–100)
MONOCYTES # BLD AUTO: 1.18 K/UL — HIGH (ref 0–0.9)
MONOCYTES NFR BLD AUTO: 9.3 % — SIGNIFICANT CHANGE UP (ref 2–14)
NEUTROPHILS # BLD AUTO: 9.15 K/UL — HIGH (ref 1.8–7.4)
NEUTROPHILS NFR BLD AUTO: 72.3 % — SIGNIFICANT CHANGE UP (ref 43–77)
NRBC # BLD AUTO: 0 K/UL — SIGNIFICANT CHANGE UP (ref 0–0)
NRBC # FLD: 0 K/UL — SIGNIFICANT CHANGE UP (ref 0–0)
NRBC BLD AUTO-RTO: 0 /100 WBCS — SIGNIFICANT CHANGE UP (ref 0–0)
PLATELET # BLD AUTO: 274 K/UL — SIGNIFICANT CHANGE UP (ref 150–400)
RBC # BLD: 4.61 M/UL — SIGNIFICANT CHANGE UP (ref 3.8–5.2)
RBC # FLD: 16.1 % — HIGH (ref 10.3–14.5)
RH IG SCN BLD-IMP: POSITIVE — SIGNIFICANT CHANGE UP
RH IG SCN BLD-IMP: POSITIVE — SIGNIFICANT CHANGE UP
T PALLIDUM AB TITR SER: NEGATIVE — SIGNIFICANT CHANGE UP
WBC # BLD: 12.66 K/UL — HIGH (ref 3.8–10.5)
WBC # FLD AUTO: 12.66 K/UL — HIGH (ref 3.8–10.5)

## 2025-05-24 RX ORDER — TERBUTALINE SULFATE 2.5 MG/1
0.25 TABLET ORAL ONCE
Refills: 0 | Status: COMPLETED | OUTPATIENT
Start: 2025-05-24 | End: 2025-05-24

## 2025-05-24 RX ORDER — SODIUM CHLORIDE 9 G/1000ML
1000 INJECTION, SOLUTION INTRAVENOUS ONCE
Refills: 0 | Status: COMPLETED | OUTPATIENT
Start: 2025-05-24 | End: 2025-05-24

## 2025-05-24 RX ORDER — CITRIC ACID/SODIUM CITRATE 300-500 MG
15 SOLUTION, ORAL ORAL EVERY 6 HOURS
Refills: 0 | Status: DISCONTINUED | OUTPATIENT
Start: 2025-05-24 | End: 2025-05-25

## 2025-05-24 RX ORDER — SODIUM CHLORIDE 9 G/1000ML
1000 INJECTION, SOLUTION INTRAVENOUS
Refills: 0 | Status: DISCONTINUED | OUTPATIENT
Start: 2025-05-24 | End: 2025-05-25

## 2025-05-24 RX ORDER — OXYTOCIN-SODIUM CHLORIDE 0.9% IV SOLN 30 UNIT/500ML 30-0.9/5 UT/ML-%
167 SOLUTION INTRAVENOUS
Qty: 30 | Refills: 0 | Status: DISCONTINUED | OUTPATIENT
Start: 2025-05-24 | End: 2025-05-25

## 2025-05-24 RX ADMIN — Medication 1 APPLICATION(S): at 01:45

## 2025-05-24 RX ADMIN — Medication 20 MILLIGRAM(S): at 02:13

## 2025-05-24 RX ADMIN — Medication 20 MILLIGRAM(S): at 23:26

## 2025-05-24 RX ADMIN — SODIUM CHLORIDE 125 MILLILITER(S): 9 INJECTION, SOLUTION INTRAVENOUS at 08:27

## 2025-05-24 RX ADMIN — SODIUM CHLORIDE 1000 MILLILITER(S): 9 INJECTION, SOLUTION INTRAVENOUS at 17:03

## 2025-05-24 RX ADMIN — TERBUTALINE SULFATE 0.25 MILLIGRAM(S): 2.5 TABLET ORAL at 17:00

## 2025-05-24 RX ADMIN — SODIUM CHLORIDE 125 MILLILITER(S): 9 INJECTION, SOLUTION INTRAVENOUS at 01:44

## 2025-05-24 NOTE — OB PROVIDER LABOR PROGRESS NOTE - NS_SUBJECTIVE/OBJECTIVE_OBGYN_ALL_OB_FT
R1 Labor & Delivery Progress Note     Pt seen & examined at bedside for possible cervical balloon placement, pt received 3 doses buccal cytotec    T(C): 37.0 (05-24-25 @ 08:28), Max: 37.1 (05-24-25 @ 00:38)  HR: 77 (05-24-25 @ 08:28) (72 - 85)  BP: 122/67 (05-24-25 @ 08:28) (122/67 - 127/72)  RR: 17 (05-24-25 @ 08:28) (16 - 17)  SpO2: --

## 2025-05-24 NOTE — OB PROVIDER LABOR PROGRESS NOTE - ASSESSMENT
A/P    Cervical balloon placed 4 hours prior, still firmly in place. Last dose of buccal cytotec at 1215pm. Patient spontaneously tachysystole with category 2 tracing.     - Terbutaline 0.25mg IM to allow for fetal recovery, consider starting pitocin when tracing recovers  - Cont EFM, [place]  - Cont IVF  - Analgesia: Comfortable with epidural    D/w Dr. Bobby Metz PGY1

## 2025-05-24 NOTE — OB RN PATIENT PROFILE - PRO PRENATAL LABS ORI SOURCE BLOOD TYPE
Pt has  arranged to get stool sample containers per check out note.    
hard copy, drawn during this pregnancy

## 2025-05-24 NOTE — OB PROVIDER IHI INDUCTION/AUGMENTATION NOTE - NS_OBIHIFHRDETAILS_OBGYN_ALL_OB_FT
"Patient: Julian Fleming    Procedure Summary     Date: 02/12/21 Room / Location:     Anesthesia Start: 0956 Anesthesia Stop: 1550    Procedure: LABOR ANALGESIA Diagnosis:     Scheduled Providers:  Provider: Tyler Kumar MD    Anesthesia Type: epidural ASA Status: 2          Anesthesia Type: epidural    Vitals  Vitals Value Taken Time   /83 02/12/21 1844   Temp 35.9 °C (96.7 °F) 02/12/21 1844   Pulse 81 02/12/21 1844   Resp 16 02/12/21 1844   SpO2 97 % 02/12/21 1540           Post Anesthesia Care and Evaluation    Patient location during evaluation: bedside  Patient participation: complete - patient participated  Level of consciousness: awake and alert  Pain management: adequate  Airway patency: patent  Anesthetic complications: No anesthetic complications    Cardiovascular status: acceptable  Respiratory status: acceptable  Hydration status: acceptable    Comments: /83 (BP Location: Right arm, Patient Position: Lying)   Pulse 81   Temp 35.9 °C (96.7 °F) (Oral)   Resp 16   Ht 154.9 cm (61\")   Wt 60.1 kg (132 lb 6.4 oz)   LMP 05/31/2020 (Within Days)   SpO2 97%   Breastfeeding Unknown   BMI 25.02 kg/m²       "
baseline 135, mod mark, + accels, - decels

## 2025-05-24 NOTE — OB PROVIDER LABOR PROGRESS NOTE - NS_SUBJECTIVE/OBJECTIVE_OBGYN_ALL_OB_FT
Patient seen and evaluated for placement of cervical balloon.    T(C): 36.8 (05-24-25 @ 10:58), Max: 37.1 (05-24-25 @ 00:38)  HR: 73 (05-24-25 @ 13:03) (72 - 102)  BP: 126/73 (05-24-25 @ 12:29) (116/69 - 149/66)  RR: 16 (05-24-25 @ 10:58) (16 - 17)  SpO2: 98% (05-24-25 @ 13:03) (89% - 100%)

## 2025-05-24 NOTE — OB PROVIDER H&P - HISTORY OF PRESENT ILLNESS
R3 Admission H&P    Subjective  HPI: 29y  @ 38w4d presents for IOL for IUGR. Most recent sono on  showed EFW in 5% and AC <1% with normal dopplers. Denies any contractions, vaginal bleeding, or leaking, and she states there is good fetal movement.     ATU (): vtx, posterior placenta, HERIBERTO 9.2, EFW 2561g (5#10) 5%, AC <1%, UAD wnl    – PNC: IUGR. GBS neg. EFW 2561g by sono.  – OBHx: 2019 -  w D&C  – GynHx: denies fibroids, cysts, endometriosis, abnormal pap smears, STIs  – PMH: denies  – PSH: D&C (2019)  – Psych: anxiety  – Social: denies   – Meds: PNV, Fe, Folic acid  – Allergies: NKDA  – Will accept blood transfusions? Yes

## 2025-05-24 NOTE — OB PROVIDER H&P - AVIAN FLU SYMPTOMS
· Drink plenty of fluids, aim for 80 oz daily for the next few days. · It is normal to feel a little discomfort the next few times you void. · Try to urinate every 2-3 hours while awake (even if you don't feel like you have to void). · If you DO urinate, please record the amount. · If you do NOT urinate within about 6 hours OR if you feel the strong uncomfortable urge to void but are not able - you'll need to come back to the office to get the catheter replaced. · Either way, call our office around 3:30 pm and let us know the void amounts (if you are urinating) or let us know that you're on your way back to the office (if you are not urinating).
No

## 2025-05-24 NOTE — OB PROVIDER LABOR PROGRESS NOTE - ASSESSMENT
Plan: Patient in stable condition. Cervical balloon placed without incident, inflated with 60/60cc saline. Patient tolerated procedure well.     - Con't IOL  - Continuous EFM, [place]  - Con't IVF    GRAY Metz PGY1

## 2025-05-24 NOTE — OB PROVIDER H&P - ASSESSMENT
Assessment  29y  @ 38w4d presents for IOL for IUGR.     Plan  1. Admit to L+D. Routine Labs. IVF.  2. Expectant management/IOL w/ buccal cytotec.  3. Fetus: cat 1 tracing. VTX. EFW 2561g by sono. Continuous EFM. Sono. No concerns.  4. Prenatal issues: none  5. GBS neg  6. Pain: IV pain meds/epidural PRN    Plan per attending physician, Dr. Charly Rogers MD  PGY3

## 2025-05-24 NOTE — OB PROVIDER LABOR PROGRESS NOTE - ASSESSMENT
A/P    Patient in stable condition. Cervix still long and closed, more anterior. Offered to place CB with speculum vs manually dilating cervix, patient requesting epidural prior to CB placement attempt.     - Cont IOL, s/p 2 doses BC  - Anesthesia called  - Cont EFM, [place]  - Cont IVF    D/w Dr. Bobby Metz PGY1

## 2025-05-24 NOTE — OB PROVIDER H&P - ATTENDING COMMENTS
[facility] Medicine Services  PROGRESS NOTE    Patient Name: Jill M Paladino  : 1967  MRN: [id number]    Date of Admission: 12/3/2024  Primary Care Physician: Sigrid Byers PA    Subjective   Subjective     CC:  Constipation, chest pain,     HPI:  Patient seen resting back in bed awake and alert.  Appears more comfortable today.  Still rating her abdominal pain 5-6/10 scale but having bowel movements now.  Denies nausea or vomiting.  She is doing a little better but not ready to go home yet.  Overall appears improved.      Objective   Objective     Vital Signs:   Temp:  [97.3 °F (36.3 °C)-97.7 °F (36.5 °C)] 97.7 °F (36.5 °C)  Heart Rate:  [77-82] 77  Resp:  [16-18] 17  BP: (106-133)/(76-82) 110/82     Physical Exam:  Constitutional: No acute distress, awake, alert.  Resting back in bed.  Appears fairly comfortable at this time.  No visitors at bedside.  HENT: NCAT, mucous membranes moist  Respiratory: Clear to auscultation bilaterally, respiratory effort normal with sats WNL.  Cardiovascular: RRR, no murmurs, rubs, or gallops  Gastrointestinal: Positive bowel sounds, mildly distended and softer to palpation today, mildly tender but no guarding or rebound.  Musculoskeletal: Trace bilateral ankle edema, stable.  RAO spontaneously.  Psychiatric: flat affect, cooperative and calm   Neurologic: Oriented x 3, nonfocal, speech clear and appropriate.  Follows commands.  Skin: No rashes      Results Reviewed:  LAB RESULTS:      Lab 24  0422 24  0504 24  0429 24  0948 24  0156 24  0155 24  2247 24  2047 24  1827   WBC 10.94* 12.74* 10.61  --  9.96  --   --  11.02* 8.02   HEMOGLOBIN 14.6 14.4 13.8  --  12.7  --   --  12.9 12.7   HEMATOCRIT 46.3 43.9 44.1  --  41.0  --   --  40.9 39.8   PLATELETS 431 423 334  --  300  --   --  319 297   NEUTROS ABS  --  9.05* 7.21*  --  6.54  --   --  8.32* 5.15   IMMATURE GRANS (ABS)  --  0.04 0.03   --  0.03  --   --  0.01 0.02   LYMPHS ABS  --  2.16 2.41  --  2.55  --   --  1.96 2.1   MONOS ABS  --  1.25* 0.65  --  0.58  --   --  0.54 0.55   EOS ABS  --  0.19 0.24  --  0.21  --   --  0.15 0.15   MCV 88.2 86.2 90.2  --  90.9  --   --  88.9 88   LACTATE, ARTERIAL  --   --   --   --   --   --   --   --  1.3   PROTIME  --   --   --   --  15.4*  --   --  14.8*  --    APTT  --   --   --   --  24.2*  --   --  28.9  --    HEPARIN ANTI-XA  --   --   --  0.30 0.12*  --   --   --   --    HSTROP T  --   --   --   --   --  307* 227* 214*  --          Lab 12/09/24  0422 12/08/24  0504 12/07/24  0410 12/05/24  1607 12/04/24  0156 12/04/24  0155   SODIUM 134* 133* 133* 136  --  138   POTASSIUM 3.8 4.7 3.7 5.0  --  4.6   CHLORIDE 94* 94* 95* 101  --  105   CO2 27.0 24.0 23.0 24.0  --  21.0*   ANION GAP 13.0 15.0 15.0 11.0  --  12.0   BUN 25* 31* 31* 19  --  15   CREATININE 0.85 1.12* 1.08* 0.94  --  0.70   EGFR 79.5 57.1* 59.7* 70.5  --  100.4   GLUCOSE 158* 111* 93 104*  --  91   CALCIUM 9.2 8.8 9.1 9.4  --  9.0   MAGNESIUM  --   --   --  2.2  --  2.2   HEMOGLOBIN A1C  --   --   --   --  6.20*  --          Lab 12/04/24  0155 12/03/24  2047 12/02/24  1827   TOTAL PROTEIN 5.8* 6.4  --    ALBUMIN 3.2* 3.4*  --    GLOBULIN 2.6 3.0  --    ALT (SGPT) 22 22  --    AST (SGOT) 45* 58*  --    BILIRUBIN 0.6 0.5  --    ALK PHOS 116 120*  --    LIPASE  --  17 23         Lab 12/07/24  0410 12/04/24  0156 12/04/24  0155 12/03/24  2247 12/03/24  2047   PROBNP 4,682.0*  --   --   --  8,588.0*   HSTROP T  --   --  307* 227* 214*   PROTIME  --  15.4*  --   --  14.8*   INR  --  1.20*  --   --  1.10         Lab 12/04/24  0155   CHOLESTEROL 135   LDL CHOL 83   HDL CHOL 40   TRIGLYCERIDES 55             Brief Urine Lab Results  (Last result in the past 365 days)        Color   Clarity   Blood   Leuk Est   Nitrite   Protein   CREAT   Urine HCG        12/04/24 2056 Yellow   Clear   Negative   Negative   Negative   Negative                    Microbiology Results Abnormal       None            XR Abdomen KUB    Result Date: 12/7/2024  XR ABDOMEN KUB Date of Exam: 12/7/2024 3:08 PM EST Indication: Constipation, assessing response to aggressive bowel regimen given ongoing pain Comparison: 1 day prior. Findings: Persistent mildly distended loop of presumed colon noted extending up into the mid abdomen with likely somewhat decreased stool burden present. There is no evidence of small bowel obstruction or overt pneumoperitoneum.     Impression: Impression: Persistent mildly distended loop of presumed colon noted extending up into the mid abdomen with likely somewhat decreased stool burden present. There is no evidence of small bowel obstruction or overt pneumoperitoneum. Electronically Signed: Ifeanyi Chavez MD  12/7/2024 4:56 PM EST  Workstation ID: [id number]     Results for orders placed during the hospital encounter of 09/25/24    Adult Transthoracic Echo Complete W/ Cont if Necessary Per Protocol    Interpretation Summary    Left ventricular systolic function is severely decreased. Calculated left ventricular EF = 10.8% Left ventricular ejection fraction appears to be less than 20%.    Left ventricular diastolic dysfunction is noted.    There is a thrombus present in the left ventricle.    The left atrial cavity is dilated.    Left atrial volume is mildly increased.    The right atrial cavity is dilated.    Moderate to severe mitral valve regurgitation is present.    Estimated right ventricular systolic pressure from tricuspid regurgitation is normal (<35 mmHg).      Current medications:  Scheduled Meds:apixaban, 5 mg, Oral, Q12H  aspirin, 81 mg, Oral, Daily  bumetanide, 1 mg, Oral, BID  empagliflozin, 10 mg, Oral, Daily  valsartan, 80 mg, Oral, Q24H   And  [Held by provider] hydroCHLOROthiazide, 12.5 mg, Oral, Q24H  metoprolol succinate XL, 50 mg, Oral, Daily  Naloxegol Oxalate, 25 mg, Oral, QAM  pantoprazole, 40 mg, Oral, Daily  pharmacy consult -  MTM, , Not Applicable, Daily  polyethylene glycol, 17 g, Oral, BID  prochlorperazine, 5 mg, Intravenous, Once  rosuvastatin, 40 mg, Oral, Daily  simethicone, 80 mg, Oral, 4x Daily AC & at Bedtime  sodium chloride, 10 mL, Intravenous, Q12H  spironolactone, 100 mg, Oral, Daily      Continuous Infusions:   PRN Meds:.  acetaminophen **OR** acetaminophen **OR** acetaminophen    influenza vaccine    Morphine    ondansetron    sodium chloride    sodium chloride    sodium chloride    Assessment & Plan   Assessment & Plan     Active Hospital Problems    Diagnosis  POA    **NSTEMI (non-ST elevated myocardial infarction) [I21.4]  Yes    Other mixed anxiety disorders [F41.3]  Yes    History of pulmonary embolism [Z86.711]  Yes    Acute on chronic HFrEF (heart failure with reduced ejection fraction) [I50.23]  Yes    Coronary artery disease of native artery of native heart with stable angina pectoris [I25.118]  Yes    Essential hypertension [I10]  Yes      Resolved Hospital Problems   No resolved problems to display.        Brief Hospital Course to date:  Jill M Paladino is a 57 y.o. female  with history of chronic HFrEF, CAD, hypertension, anxiety, recent multiple visits to [facility] for UTI who presented to [place] ED with complaint of constipation, chest pain and feeling full of fluid, admitted with NSTEMI    This patient's problems and plans were partially entered by my partner and updated as appropriate by me 12/09/24.    Assessment/plan:     NSTEMI  Left ventricular mural thrombus   CAD  - Troponin remains acute elevated, trending up,  - EKG unremarkable, status post aspirin, continue statin, beta-blocker  --Cardiology consulted, s/p heparin gtt.now back on eliquis   -- known hx of LAD  with collaterals from cath at [facility] but is declining any further procedures at this time   --Stable.  No current complaints of chest pain or shortness of breath.  --No new issues.  Followed by Dr. Clemons with cards EP.  Continuing current Bumex.   Will reassess today.  Close monitoring of I's/O's.  Monitor weights.     Acute on chronic HFrEF  - Echo completed 9/25/2024 noting EF 10.8%  - On Bumex 1 mg daily, changed to IV and continue twice daily for now  --Continue Jardiance, strict I/O's, has reportedly history of medical noncompliance  --Cardiology consulted  -- Stable.  As above.    Mild leukocytosis  --Afebrile.  Push pulmonary toilet.    Mildly increased creatinine  --Poor p.o. intake.  --Encourage p.o. fluids, improved today at 0.85.     History of PE  - Eliquis     Hypertension  Dyslipidemia  - Continue metoprolol and Diovan  - Continue statin  -- Stable      Severe anxiety  - States that she requires Wellbutrin namebrand as generic does not work for her  - Reports taking spironolactone when not on Wellbutrin  - Ativan as needed  --Generally stable.  States the Ativan helps well.     Constipation/obstipation--resolving  -- Prior KUB showed large volume colonic stool burden compatible with constipation   -- no results from s/s enema or lactulose enema   -- GI consulted.    --started movantik and MiraLAX twice daily yesterday 12/7.  Goal is for stool to be texture peanut butter; may titrate dose to achieve this.  >>> If no bowel movement every 3 days, recommend use of Dulcolax suppository  -- Follow-up with PCP and GI on discharge.  Notes --remains a poor candidate for endoscopic procedure due to significantly diminished EF.  -- Now having multiple bowel movements since yesterday.  Continue current regimen.      Expected Discharge Location and Transportation: home likely tomorrow pending final cardiology recommendations.      Expected Discharge   Expected Discharge Date: 12/10/2024; Expected Discharge Time:      VTE Prophylaxis:  Pharmacologic & mechanical VTE prophylaxis orders are present.         AM-PAC 6 Clicks Score (PT): 23 (12/09/24 6998)    CODE STATUS:   Code Status and Medical Interventions: CPR (Attempt to Resuscitate); Full Support    Ordered at: 12/04/24 0312     Code Status (Patient has no pulse and is not breathing):    CPR (Attempt to Resuscitate)     Medical Interventions (Patient has pulse or is breathing):    Full Support       Tanja Morales, APRN  12/09/24       P0 at 38+ weeks with IUGR for induction of labor  VE 0/0/-3 for buccal cyto/CB    Allen Parks MD

## 2025-05-24 NOTE — OB PROVIDER H&P - NSHPPHYSICALEXAM_GEN_ALL_CORE
Objective  – VS  T(C): 37.1 (05-24-25 @ 01:11)  HR: 85 (05-24-25 @ 01:11)  BP: 127/72 (05-24-25 @ 01:11)  RR: 16 (05-24-25 @ 01:11)  SpO2: --    Physical Exam  CV: RRR  Pulm: breathing comfortably on RA  Abd: gravid, nontender  Extr: moving all extremities with ease    – VE: 0/0/-3  – FHT: baseline 135, mod variability, +accels, -decels  – Meckling: q7-10min  – EFW: 2561g by sono  – Sono: vertex

## 2025-05-24 NOTE — OB PROVIDER LABOR PROGRESS NOTE - NS_SUBJECTIVE/OBJECTIVE_OBGYN_ALL_OB_FT
R1 Labor & Delivery Progress Note     Pt seen & examined at bedside for cat 2 tracing    T(C): 36.6 (05-24-25 @ 15:35), Max: 37.1 (05-24-25 @ 00:38)  HR: 94 (05-24-25 @ 16:55) (72 - 104)  BP: 129/65 (05-24-25 @ 16:29) (112/59 - 149/66)  RR: 17 (05-24-25 @ 15:35) (16 - 17)  SpO2: 100% (05-24-25 @ 16:55) (79% - 100%)

## 2025-05-25 PROCEDURE — 88307 TISSUE EXAM BY PATHOLOGIST: CPT | Mod: 26

## 2025-05-25 PROCEDURE — 59514 CESAREAN DELIVERY ONLY: CPT | Mod: U9,GC

## 2025-05-25 RX ORDER — KETOROLAC TROMETHAMINE 30 MG/ML
30 INJECTION, SOLUTION INTRAMUSCULAR; INTRAVENOUS EVERY 6 HOURS
Refills: 0 | Status: DISCONTINUED | OUTPATIENT
Start: 2025-05-25 | End: 2025-05-26

## 2025-05-25 RX ORDER — OXYCODONE HYDROCHLORIDE 30 MG/1
5 TABLET ORAL
Refills: 0 | Status: COMPLETED | OUTPATIENT
Start: 2025-05-25 | End: 2025-06-01

## 2025-05-25 RX ORDER — SODIUM CHLORIDE 9 G/1000ML
300 INJECTION, SOLUTION INTRAVENOUS ONCE
Refills: 0 | Status: DISCONTINUED | OUTPATIENT
Start: 2025-05-25 | End: 2025-05-26

## 2025-05-25 RX ORDER — ACETAMINOPHEN 500 MG/5ML
975 LIQUID (ML) ORAL
Refills: 0 | Status: DISCONTINUED | OUTPATIENT
Start: 2025-05-25 | End: 2025-05-29

## 2025-05-25 RX ORDER — HEPARIN SODIUM 1000 [USP'U]/ML
5000 INJECTION INTRAVENOUS; SUBCUTANEOUS EVERY 12 HOURS
Refills: 0 | Status: DISCONTINUED | OUTPATIENT
Start: 2025-05-25 | End: 2025-05-29

## 2025-05-25 RX ORDER — SODIUM CHLORIDE 9 G/1000ML
500 INJECTION, SOLUTION INTRAVENOUS ONCE
Refills: 0 | Status: DISCONTINUED | OUTPATIENT
Start: 2025-05-25 | End: 2025-05-29

## 2025-05-25 RX ORDER — OXYCODONE HYDROCHLORIDE 30 MG/1
5 TABLET ORAL ONCE
Refills: 0 | Status: DISCONTINUED | OUTPATIENT
Start: 2025-05-25 | End: 2025-05-29

## 2025-05-25 RX ORDER — MAGNESIUM HYDROXIDE 400 MG/5ML
30 SUSPENSION ORAL
Refills: 0 | Status: DISCONTINUED | OUTPATIENT
Start: 2025-05-25 | End: 2025-05-29

## 2025-05-25 RX ORDER — IBUPROFEN 200 MG
600 TABLET ORAL EVERY 6 HOURS
Refills: 0 | Status: COMPLETED | OUTPATIENT
Start: 2025-05-25 | End: 2026-04-23

## 2025-05-25 RX ORDER — MODIFIED LANOLIN 100 %
1 CREAM (GRAM) TOPICAL EVERY 6 HOURS
Refills: 0 | Status: DISCONTINUED | OUTPATIENT
Start: 2025-05-25 | End: 2025-05-29

## 2025-05-25 RX ORDER — DIPHENHYDRAMINE HCL 12.5MG/5ML
25 ELIXIR ORAL EVERY 6 HOURS
Refills: 0 | Status: DISCONTINUED | OUTPATIENT
Start: 2025-05-25 | End: 2025-05-29

## 2025-05-25 RX ORDER — CLOSTRIDIUM TETANI TOXOID ANTIGEN (FORMALDEHYDE INACTIVATED), CORYNEBACTERIUM DIPHTHERIAE TOXOID ANTIGEN (FORMALDEHYDE INACTIVATED), BORDETELLA PERTUSSIS TOXOID ANTIGEN (GLUTARALDEHYDE INACTIVATED), BORDETELLA PERTUSSIS FILAMENTOUS HEMAGGLUTININ ANTIGEN (FORMALDEHYDE INACTIVATED), BORDETELLA PERTUSSIS PERTACTIN ANTIGEN, AND BORDETELLA PERTUSSIS FIMBRIAE 2/3 ANTIGEN 5; 2; 2.5; 5; 3; 5 [LF]/.5ML; [LF]/.5ML; UG/.5ML; UG/.5ML; UG/.5ML; UG/.5ML
0.5 INJECTION, SUSPENSION INTRAMUSCULAR ONCE
Refills: 0 | Status: DISCONTINUED | OUTPATIENT
Start: 2025-05-25 | End: 2025-05-29

## 2025-05-25 RX ORDER — OXYTOCIN-SODIUM CHLORIDE 0.9% IV SOLN 30 UNIT/500ML 30-0.9/5 UT/ML-%
42 SOLUTION INTRAVENOUS
Qty: 30 | Refills: 0 | Status: DISCONTINUED | OUTPATIENT
Start: 2025-05-25 | End: 2025-05-25

## 2025-05-25 RX ORDER — SODIUM CHLORIDE 9 G/1000ML
1000 INJECTION, SOLUTION INTRAVENOUS ONCE
Refills: 0 | Status: COMPLETED | OUTPATIENT
Start: 2025-05-25 | End: 2025-05-25

## 2025-05-25 RX ORDER — SODIUM CHLORIDE 9 G/1000ML
1000 INJECTION, SOLUTION INTRAVENOUS
Refills: 0 | Status: DISCONTINUED | OUTPATIENT
Start: 2025-05-25 | End: 2025-05-25

## 2025-05-25 RX ORDER — ACETAMINOPHEN 500 MG/5ML
1000 LIQUID (ML) ORAL ONCE
Refills: 0 | Status: COMPLETED | OUTPATIENT
Start: 2025-05-25 | End: 2025-05-25

## 2025-05-25 RX ORDER — SIMETHICONE 80 MG
80 TABLET,CHEWABLE ORAL EVERY 4 HOURS
Refills: 0 | Status: DISCONTINUED | OUTPATIENT
Start: 2025-05-25 | End: 2025-05-29

## 2025-05-25 RX ORDER — OXYCODONE HYDROCHLORIDE 30 MG/1
5 TABLET ORAL ONCE
Refills: 0 | Status: DISCONTINUED | OUTPATIENT
Start: 2025-05-25 | End: 2025-05-25

## 2025-05-25 RX ADMIN — OXYCODONE HYDROCHLORIDE 5 MILLIGRAM(S): 30 TABLET ORAL at 11:10

## 2025-05-25 RX ADMIN — MAGNESIUM HYDROXIDE 30 MILLILITER(S): 400 SUSPENSION ORAL at 21:28

## 2025-05-25 RX ADMIN — KETOROLAC TROMETHAMINE 30 MILLIGRAM(S): 30 INJECTION, SOLUTION INTRAMUSCULAR; INTRAVENOUS at 18:05

## 2025-05-25 RX ADMIN — Medication 975 MILLIGRAM(S): at 22:00

## 2025-05-25 RX ADMIN — OXYCODONE HYDROCHLORIDE 5 MILLIGRAM(S): 30 TABLET ORAL at 10:40

## 2025-05-25 RX ADMIN — Medication 975 MILLIGRAM(S): at 15:05

## 2025-05-25 RX ADMIN — KETOROLAC TROMETHAMINE 30 MILLIGRAM(S): 30 INJECTION, SOLUTION INTRAMUSCULAR; INTRAVENOUS at 11:58

## 2025-05-25 RX ADMIN — KETOROLAC TROMETHAMINE 30 MILLIGRAM(S): 30 INJECTION, SOLUTION INTRAMUSCULAR; INTRAVENOUS at 12:30

## 2025-05-25 RX ADMIN — SODIUM CHLORIDE 125 MILLILITER(S): 9 INJECTION, SOLUTION INTRAVENOUS at 02:40

## 2025-05-25 RX ADMIN — Medication 400 MILLIGRAM(S): at 08:28

## 2025-05-25 RX ADMIN — Medication 80 MILLIGRAM(S): at 21:28

## 2025-05-25 RX ADMIN — Medication 975 MILLIGRAM(S): at 14:33

## 2025-05-25 RX ADMIN — SODIUM CHLORIDE 1000 MILLILITER(S): 9 INJECTION, SOLUTION INTRAVENOUS at 02:30

## 2025-05-25 RX ADMIN — Medication 975 MILLIGRAM(S): at 21:28

## 2025-05-25 RX ADMIN — KETOROLAC TROMETHAMINE 30 MILLIGRAM(S): 30 INJECTION, SOLUTION INTRAMUSCULAR; INTRAVENOUS at 17:35

## 2025-05-25 RX ADMIN — Medication 1000 MILLIGRAM(S): at 09:00

## 2025-05-25 RX ADMIN — HEPARIN SODIUM 5000 UNIT(S): 1000 INJECTION INTRAVENOUS; SUBCUTANEOUS at 17:35

## 2025-05-25 NOTE — CHART NOTE - NSCHARTNOTEFT_GEN_A_CORE
Late entry due to clinical duties, patient seen at approximately 5pm    Patient seen at bedside for evaluation of mood and possible suicidal ideation. Patient is POD0 s/p pLTCS for cat 2 tracing. Prior to going back to OR, patient reportedly stated that she wanted to die, would rather die than have a  section, etc. Psych was consulted post-op, however evaluation was deferred at that time as patient was still immediately post-op. No documentation from psychiatry during day shift. Multiple attempts were made to reach psychiatry team to follow up consult. ED psych resident was reached, connected to CL provider who recommended placing patient on 1:1 observation and that patient would be seen on Tuesday. Provider said that psych office could be reached after 830am for possible earlier evaluation on Monday, but evaluation would likely be on Tuesday.     I spoke with the patient at her bedside to assess for continued suicidal ideation or thoughts of self-harm. Patient was very tearful, explaining that she was frustrated with her partner for forcing her to have an induction when she had stated multiple times that she wanted an elective  section. She says she has no family here and felt abandoned/ignored by her partner. Just before her surgery, she stated that she would rather die than have a  section, explaining that she didn't want to go through it alone without family support. She also expressed lack of excitement towards the baby, does not feel any emotional attachment/desire to bond with the baby at this time. Patient currently denies any thoughts of self-harm or suicidal ideation. She expresses desire to bond with the baby but does not feel those emotions yet.     Low concern for active suicidal ideation at this time, low concern for harm towards self or others. Patients emotions are consistent with her traumatic experience, also likely experiencing baby blues. Social work consulted, patient would also benefit from psychiatry meeting to address traumatic experience and high risk for postpartum depression. Will not place patient on 1:10 at this time. If patient expresses thoughts of harm towards self or others, will readdress.     d/w Dr. Bobby Metz PGY1 Late entry due to clinical duties, patient seen at approximately 5pm    Patient seen at bedside for evaluation of mood and possible suicidal ideation. Patient is POD0 s/p pLTCS for cat 2 tracing. Prior to going back to OR, patient reportedly stated that she wanted to die, would rather die than have a  section, etc. Psych was consulted post-op, however evaluation was deferred at that time as patient was still immediately post-op. No documentation from psychiatry during day shift. Multiple attempts were made to reach psychiatry team to follow up consult. ED psych resident was reached, connected to CL provider who recommended placing patient on 1:1 observation and that patient would be seen on Tuesday. Provider said that psych office could be reached after 830am for possible earlier evaluation on Monday, but evaluation would likely be on Tuesday.     I spoke with the patient at her bedside to assess for continued suicidal ideation or thoughts of self-harm. Patient was very tearful, explaining that she was frustrated with her partner for forcing her to have an induction when she had stated multiple times that she wanted an elective  section. She says she has no family here and felt abandoned/ignored by her partner. Just before her surgery, she stated that she would rather die than have a  section, explaining that she didn't want to go through it alone without family support. She also expressed lack of excitement towards the baby, does not feel any emotional attachment/desire to bond with the baby at this time. Patient currently denies any thoughts of self-harm or suicidal ideation. She expresses desire to bond with the baby but does not feel those emotions yet.     Low concern for active suicidal ideation at this time, low concern for harm towards self or others. Patients emotions are consistent with her traumatic experience, also likely experiencing baby blues. Social work consulted, patient would also benefit from psychiatry meeting to address traumatic experience and high risk for postpartum depression. Will not place patient on 1:1 at this time. If patient expresses thoughts of harm towards self or others, will readdress.     d/w Dr. Bobby Metz PGY1

## 2025-05-25 NOTE — OB PROVIDER LABOR PROGRESS NOTE - ASSESSMENT
29y  @ 38wks/5d here for IOL for IUGR. Pt noted to have late decels on FHT    - BC #7 @ 12:15am  - cervical balloon removed at time of exam  - VE /-3  - Cont fetal/maternal monitoring  - Will reassess PRN    Plan per Dr. Bobby Murillo, PGY-1     29y  @ 38wks/5d here for IOL for IUGR. Pt noted to have late decels on FHT    - BC #7 @ 12:15am  - cervical balloon removed at time of exam  - VE /-3 @ 02:18AM  - Cont fetal/maternal monitoring  - Will reassess PRN    Plan per Dr. Bobby Murillo, PGY-1

## 2025-05-25 NOTE — OB PROVIDER LABOR PROGRESS NOTE - NS_SUBJECTIVE/OBJECTIVE_OBGYN_ALL_OB_FT
Patient seen and examined for progression of labor after patient noted to have decelerations on FHT.    T(C): 36.8 (05-25-25 @ 01:03), Max: 37.2 (05-25-25 @ 00:00)  HR: 80 (05-25-25 @ 03:29) (70 - 119)  BP: 123/73 (05-25-25 @ 03:29) (97/64 - 155/74)  RR: 16 (05-25-25 @ 01:03) (15 - 18)  SpO2: 100% (05-25-25 @ 03:32) (72% - 100%) Documentation delayed due to clinical duties    Patient seen and examined for progression of labor after patient noted to have decelerations on FHT.    T(C): 36.8 (05-25-25 @ 01:03), Max: 37.2 (05-25-25 @ 00:00)  HR: 80 (05-25-25 @ 03:29) (70 - 119)  BP: 123/73 (05-25-25 @ 03:29) (97/64 - 155/74)  RR: 16 (05-25-25 @ 01:03) (15 - 18)  SpO2: 100% (05-25-25 @ 03:32) (72% - 100%)

## 2025-05-25 NOTE — DISCHARGE NOTE OB - CARE PLAN
1 Principal Discharge DX:	 delivery delivered  Assessment and plan of treatment:	Routine postop care

## 2025-05-25 NOTE — OB PROVIDER LABOR PROGRESS NOTE - NS_SUBJECTIVE/OBJECTIVE_OBGYN_ALL_OB_FT
Patient seen at bedside to evaluate for cervical change and placement of internal monitors. Resting comfortably with epidural in place. LR bolus infusing.     ICU Vital Signs Last 24 Hrs  T(C): 36.8 (25 May 2025 01:03), Max: 37.2 (25 May 2025 00:00)  T(F): 98.24 (25 May 2025 01:03), Max: 98.96 (25 May 2025 00:00)  HR: 79 (25 May 2025 03:07) (70 - 119)  BP: 101/60 (25 May 2025 02:29) (97/64 - 155/74)  RR: 16 (25 May 2025 01:03) (15 - 18)  SpO2: 98% (25 May 2025 03:07) (72% - 100%)

## 2025-05-25 NOTE — LACTATION INITIAL EVALUATION - INTERVENTION OUTCOME
Encouraged to ask for assistance as needed/verbalizes understanding/demonstrates understanding of teaching/good return demonstration/Lactation team to follow up

## 2025-05-25 NOTE — OB RN INTRAOPERATIVE NOTE - NSSELHIDDEN_OBGYN_ALL_OB_FT
[NS_DeliveryAttending1_OBGYN_ALL_OB_FT:[id number]],[NS_DeliveryAssist1_OBGYN_ALL_OB_FT:[id number]],[NS_DeliveryRN_OBGYN_ALL_OB_FT:[id number]]

## 2025-05-25 NOTE — DISCHARGE NOTE OB - PATIENT PORTAL LINK FT
You can access the FollowMyHealth Patient Portal offered by [facility] by registering at the following website: [email or web address]. By joining [employer]’s FollowMyHealth portal, you will also be able to view your health information using other applications (apps) compatible with our system.

## 2025-05-25 NOTE — OB PROVIDER LABOR PROGRESS NOTE - NS_OBIHIFHRDETAILS_OBGYN_ALL_OB_FT
Baseline 140  Mod variablility  Late decels  Accels present  Irregular contractions  Category II
baseline indeterminate, discontinuous
cat 1
baseline 150/minimal/-accels/variable and late decels  cat 2
150/mod/-accels/+late decels

## 2025-05-25 NOTE — DISCHARGE NOTE OB - FINANCIAL ASSISTANCE
[facility] provides services at a reduced cost to those who are determined to be eligible through [facility]’s financial assistance program. Information regarding [facility]’s financial assistance program can be found by going to [email or web address] or by calling [phone].

## 2025-05-25 NOTE — PROVIDER CONTACT NOTE (OTHER) - ACTION/TREATMENT ORDERED:
Per Dr. Metz, no need for 1:1 observation at present time, based on her bedside evaluation, and pt. to be evaluated by psych attending on 5/26/25 in the am.

## 2025-05-25 NOTE — DISCHARGE NOTE OB - MEDICATION SUMMARY - MEDICATIONS TO TAKE
I will START or STAY ON the medications listed below when I get home from the hospital:  None I will START or STAY ON the medications listed below when I get home from the hospital:    ibuprofen 600 mg oral tablet  -- 1 tab(s) by mouth every 6 hours as needed for  mild pain  -- Indication: For  delivery delivered    acetaminophen 325 mg oral tablet  -- 3 tab(s) by mouth every 6 hours as needed for  mild pain  -- Indication: For  delivery delivered    multivitamin, prenatal  -- 1 tab(s) by mouth once a day  -- Indication: For  delivery delivered   I will START or STAY ON the medications listed below when I get home from the hospital:    ibuprofen 600 mg oral tablet  -- 1 tab(s) by mouth every 6 hours as needed for  mild pain  -- Indication: For  delivery delivered    acetaminophen 325 mg oral tablet  -- 3 tab(s) by mouth every 6 hours as needed for  mild pain  -- Indication: For  delivery delivered    multivitamin, prenatal  -- 1 tab(s) by mouth once a day  -- Indication: For  delivery delivered    simethicone 80 mg oral tablet, chewable  -- 1 tab(s) chewed every 6 hours as needed for  gas pain  -- Indication: For for gas pain

## 2025-05-25 NOTE — LACTATION INITIAL EVALUATION - POTENTIAL FOR
knowledge deficit My signature below certifies that the above stated patient is homebound and upon completion of the Face-To-Face encounter, has the need for intermittent skilled nursing, physical therapy and/or speech or occupational therapy services in their home for their current diagnosis as outlined in their initial plan of care. These services will continue to be monitored by myself or another physician.

## 2025-05-25 NOTE — OB RN DELIVERY SUMMARY - BABY A: APGAR 1 MIN RESP RATE, DELIVERY
Reviewed the CT right shoulder and discussed Prolia while continuing Calcium and vitamin D  Start Citrical Plus (2) good, crying

## 2025-05-25 NOTE — DISCHARGE NOTE OB - MEDICATION SUMMARY - MEDICATIONS TO STOP TAKING
I will STOP taking the medications listed below when I get home from the hospital:    milk of magnesia prn    milk of magnesium   I will STOP taking the medications listed below when I get home from the hospital:    folic acid  -- 1 by mouth once a day    milk of magnesia prn

## 2025-05-25 NOTE — OB PROVIDER DELIVERY SUMMARY - NSPROVIDERDELIVERYNOTE_OBGYN_ALL_OB_FT
Unscheduled pLTCS @38w5d for persistent category 2 tracing in latent labor during IOL for IUGR    Grossly normal uterus, bilateral fallopian tubes & ovaries  Liveborn female infant, APGARS 7/9  Cephalic presentation, clear copious fluid  Hysterotomy closed in single layer with Vicryl suture  Posterior hematoma oversewn   Fascia layer closed with Vicryl suture  Subcutaneous tissue reapproximated with Plain Gut suture  Deep dermal layer reapproximated with Plain Gut suture  Skin closed in subcuticular fashion with Monocryl Franck suture    QBL: 307  IVF: 1300  UOP: 125    Christina Ferguson, PGY2

## 2025-05-25 NOTE — CHART NOTE - NSCHARTNOTEFT_GEN_A_CORE
Recurrent late decelerations and minimal variability noted despite amnioinfusion and intrauterine resuscitation at 2cm dilated. Decision made to proceed with  section due to NRFHT remote from delivery. Informed of risks inclusive of but not limited to infection, bleeding and injury to nearby organs. Consent signed.   Prior to this, pt arguing with partner yelling that "nobody cares about me, I want to die. Just do whatever you want, nobody cares about me, only the baby".    Plan for  section once again discussed with pt and partner, pt states that she understands plan and reconfirms acceptance of blood transfusion in event of emergency.     Nursing and anesthesia aware.

## 2025-05-25 NOTE — DISCHARGE NOTE OB - CARE PROVIDER_API CALL
Kinjal Rosales  Obstetrics and Gynecology  [address] [facility], [address]  [place] [address]  Phone: [phone]  Fax: [phone]  Follow Up Time:

## 2025-05-25 NOTE — DISCHARGE NOTE OB - NS MD DC FALL RISK RISK
For information on Fall & Injury Prevention, visit: [email or web address] OR  [email or web address] OR  https://www.cdc.gov/steadi/patient.html

## 2025-05-25 NOTE — OB PROVIDER DELIVERY SUMMARY - NSSELHIDDEN_OBGYN_ALL_OB_FT
[NS_DeliveryAttending1_OBGYN_ALL_OB_FT:[id number]],[NS_DeliveryAssist1_OBGYN_ALL_OB_FT:[id number]],[NS_DeliveryRN_OBGYN_ALL_OB_FT:[id number]=]

## 2025-05-25 NOTE — OB PROVIDER LABOR PROGRESS NOTE - ASSESSMENT
28 yo  @38+5 with AROM clear fluid for placement of internal monitoring. IUPC and ISE placed without incident. Patient tolerated well. To continue tracing resuscitation    P:  -Cont maternal repositioning  -Cont LR bolus to 1L  -Cont regional anesthesia  -Consider amnio if tracing demonstrates variable decels    MD Kanoon aware  Cher Cristobal NP

## 2025-05-25 NOTE — OB RN DELIVERY SUMMARY - NS_SEPSISRSKCALC_OBGYN_ALL_OB_FT
EOS calculated successfully. EOS Risk Factor: 0.5/1000 live births ([facility] national incidence); GA=38w5d; Temp=98.96; ROM=1.85; GBS='Negative'; Antibiotics='No antibiotics or any antibiotics < 2 hrs prior to birth'

## 2025-05-25 NOTE — CHART NOTE - NSCHARTNOTEFT_GEN_A_CORE
Date: 2025		  Time called: 336a    PTA called by:       ODILIA Lerma                                                     Title: RN    Location of patient: [facility]    Indication for PTA    Cat2 tracing, IUGR (5%, AC<1%, UAD wnl) remote from delivery    Antepartum 	                                                                                          If Antepartum, Gestational Age: 38w5d                                                                        Responders (list names):     Dr Kylah WORLEY   Dr Bobby WORLEY Private Attending  Charge RN    Assessment/Diagnosis:  Removed cervical balloon, s/p 12hrs in place  s/p IUPC, ISE, 1L bolus  s/p terb earlier in IOL    Plan:  - will start amnioinfusion, bolus 300cc, maintenance 125cc/hr of NS  - reassess in 20m to assess for improvement in fht  -  on  given cat2 remote from delivery    Amyeo Afroz Jereen, PGY-4  d/w Dr Rosales

## 2025-05-26 LAB
BASOPHILS # BLD AUTO: 0.03 K/UL — SIGNIFICANT CHANGE UP (ref 0–0.2)
BASOPHILS NFR BLD AUTO: 0.2 % — SIGNIFICANT CHANGE UP (ref 0–2)
EOSINOPHIL # BLD AUTO: 0.08 K/UL — SIGNIFICANT CHANGE UP (ref 0–0.5)
EOSINOPHIL NFR BLD AUTO: 0.5 % — SIGNIFICANT CHANGE UP (ref 0–6)
HCT VFR BLD CALC: 31.9 % — LOW (ref 34.5–45)
HGB BLD-MCNC: 10.5 G/DL — LOW (ref 11.5–15.5)
IANC: 13.52 K/UL — HIGH (ref 1.8–7.4)
IMM GRANULOCYTES NFR BLD AUTO: 0.8 % — SIGNIFICANT CHANGE UP (ref 0–0.9)
LYMPHOCYTES # BLD AUTO: 15.4 % — SIGNIFICANT CHANGE UP (ref 13–44)
LYMPHOCYTES # BLD AUTO: 2.72 K/UL — SIGNIFICANT CHANGE UP (ref 1–3.3)
MCHC RBC-ENTMCNC: 26.4 PG — LOW (ref 27–34)
MCHC RBC-ENTMCNC: 32.9 G/DL — SIGNIFICANT CHANGE UP (ref 32–36)
MCV RBC AUTO: 80.2 FL — SIGNIFICANT CHANGE UP (ref 80–100)
MONOCYTES # BLD AUTO: 1.13 K/UL — HIGH (ref 0–0.9)
MONOCYTES NFR BLD AUTO: 6.4 % — SIGNIFICANT CHANGE UP (ref 2–14)
NEUTROPHILS # BLD AUTO: 13.52 K/UL — HIGH (ref 1.8–7.4)
NEUTROPHILS NFR BLD AUTO: 76.7 % — SIGNIFICANT CHANGE UP (ref 43–77)
NRBC # BLD AUTO: 0 K/UL — SIGNIFICANT CHANGE UP (ref 0–0)
NRBC # FLD: 0 K/UL — SIGNIFICANT CHANGE UP (ref 0–0)
NRBC BLD AUTO-RTO: 0 /100 WBCS — SIGNIFICANT CHANGE UP (ref 0–0)
PLATELET # BLD AUTO: 203 K/UL — SIGNIFICANT CHANGE UP (ref 150–400)
RBC # BLD: 3.98 M/UL — SIGNIFICANT CHANGE UP (ref 3.8–5.2)
RBC # FLD: 15.9 % — HIGH (ref 10.3–14.5)
WBC # BLD: 17.62 K/UL — HIGH (ref 3.8–10.5)
WBC # FLD AUTO: 17.62 K/UL — HIGH (ref 3.8–10.5)

## 2025-05-26 RX ORDER — IBUPROFEN 200 MG
600 TABLET ORAL EVERY 6 HOURS
Refills: 0 | Status: DISCONTINUED | OUTPATIENT
Start: 2025-05-26 | End: 2025-05-29

## 2025-05-26 RX ORDER — BISACODYL 5 MG
10 TABLET, DELAYED RELEASE (ENTERIC COATED) ORAL ONCE
Refills: 0 | Status: COMPLETED | OUTPATIENT
Start: 2025-05-26 | End: 2025-05-26

## 2025-05-26 RX ADMIN — KETOROLAC TROMETHAMINE 30 MILLIGRAM(S): 30 INJECTION, SOLUTION INTRAMUSCULAR; INTRAVENOUS at 03:14

## 2025-05-26 RX ADMIN — KETOROLAC TROMETHAMINE 30 MILLIGRAM(S): 30 INJECTION, SOLUTION INTRAMUSCULAR; INTRAVENOUS at 05:38

## 2025-05-26 RX ADMIN — HEPARIN SODIUM 5000 UNIT(S): 1000 INJECTION INTRAVENOUS; SUBCUTANEOUS at 05:38

## 2025-05-26 RX ADMIN — Medication 975 MILLIGRAM(S): at 22:53

## 2025-05-26 RX ADMIN — Medication 600 MILLIGRAM(S): at 19:30

## 2025-05-26 RX ADMIN — Medication 600 MILLIGRAM(S): at 22:53

## 2025-05-26 RX ADMIN — Medication 80 MILLIGRAM(S): at 21:32

## 2025-05-26 RX ADMIN — KETOROLAC TROMETHAMINE 30 MILLIGRAM(S): 30 INJECTION, SOLUTION INTRAMUSCULAR; INTRAVENOUS at 00:43

## 2025-05-26 RX ADMIN — KETOROLAC TROMETHAMINE 30 MILLIGRAM(S): 30 INJECTION, SOLUTION INTRAMUSCULAR; INTRAVENOUS at 06:45

## 2025-05-26 RX ADMIN — Medication 600 MILLIGRAM(S): at 18:07

## 2025-05-26 RX ADMIN — Medication 975 MILLIGRAM(S): at 15:12

## 2025-05-26 RX ADMIN — Medication 975 MILLIGRAM(S): at 21:24

## 2025-05-26 RX ADMIN — HEPARIN SODIUM 5000 UNIT(S): 1000 INJECTION INTRAVENOUS; SUBCUTANEOUS at 18:07

## 2025-05-26 RX ADMIN — Medication 975 MILLIGRAM(S): at 15:42

## 2025-05-26 RX ADMIN — Medication 10 MILLIGRAM(S): at 21:25

## 2025-05-26 NOTE — PROGRESS NOTE ADULT - ATTENDING COMMENTS
Agree with assessment and plan as above  Pending psych consult, for SW consult  Patient otherwise recovering well, continue routine PP care    Allen Parks MD

## 2025-05-26 NOTE — PROGRESS NOTE ADULT - ASSESSMENT
A/P: 30yo POD#1 s/p VA-pLTCS for Cat 2 tracing. Pt is stable and doing well post-operatively.    #behavioralhealth  -Pt verbalized SI at time of labor  -Psych consulted    #PP  - VSS  - QBL: 307; Hct: 37.9>31.9  - Continue regular diet and PO hydration  - Increase ambulation, OOB and SCD's while in bed.  - Continue motrin, tylenol, oxycodone PRN for pain control. Encouraged pt to request PRN pain medication for uncontrolled pain.  - Continue to monitor vitals and symptoms    Negin Murillo, PGY-1

## 2025-05-27 PROCEDURE — 90792 PSYCH DIAG EVAL W/MED SRVCS: CPT

## 2025-05-27 RX ORDER — OXYCODONE HYDROCHLORIDE 30 MG/1
5 TABLET ORAL
Refills: 0 | Status: DISCONTINUED | OUTPATIENT
Start: 2025-05-27 | End: 2025-05-29

## 2025-05-27 RX ORDER — FERROUS SULFATE 137(45) MG
325 TABLET, EXTENDED RELEASE ORAL DAILY
Refills: 0 | Status: DISCONTINUED | OUTPATIENT
Start: 2025-05-27 | End: 2025-05-29

## 2025-05-27 RX ORDER — POLYETHYLENE GLYCOL 3350 17 G/17G
17 POWDER, FOR SOLUTION ORAL DAILY
Refills: 0 | Status: DISCONTINUED | OUTPATIENT
Start: 2025-05-27 | End: 2025-05-29

## 2025-05-27 RX ORDER — SENNA 187 MG
2 TABLET ORAL AT BEDTIME
Refills: 0 | Status: DISCONTINUED | OUTPATIENT
Start: 2025-05-27 | End: 2025-05-29

## 2025-05-27 RX ADMIN — Medication 600 MILLIGRAM(S): at 00:07

## 2025-05-27 RX ADMIN — Medication 600 MILLIGRAM(S): at 06:31

## 2025-05-27 RX ADMIN — Medication 600 MILLIGRAM(S): at 17:51

## 2025-05-27 RX ADMIN — Medication 975 MILLIGRAM(S): at 09:25

## 2025-05-27 RX ADMIN — OXYCODONE HYDROCHLORIDE 5 MILLIGRAM(S): 30 TABLET ORAL at 16:13

## 2025-05-27 RX ADMIN — Medication 80 MILLIGRAM(S): at 06:04

## 2025-05-27 RX ADMIN — Medication 975 MILLIGRAM(S): at 08:55

## 2025-05-27 RX ADMIN — POLYETHYLENE GLYCOL 3350 17 GRAM(S): 17 POWDER, FOR SOLUTION ORAL at 13:34

## 2025-05-27 RX ADMIN — OXYCODONE HYDROCHLORIDE 5 MILLIGRAM(S): 30 TABLET ORAL at 22:12

## 2025-05-27 RX ADMIN — Medication 975 MILLIGRAM(S): at 14:59

## 2025-05-27 RX ADMIN — HEPARIN SODIUM 5000 UNIT(S): 1000 INJECTION INTRAVENOUS; SUBCUTANEOUS at 06:04

## 2025-05-27 RX ADMIN — OXYCODONE HYDROCHLORIDE 5 MILLIGRAM(S): 30 TABLET ORAL at 15:43

## 2025-05-27 RX ADMIN — Medication 975 MILLIGRAM(S): at 22:12

## 2025-05-27 RX ADMIN — OXYCODONE HYDROCHLORIDE 5 MILLIGRAM(S): 30 TABLET ORAL at 21:25

## 2025-05-27 RX ADMIN — Medication 600 MILLIGRAM(S): at 13:34

## 2025-05-27 RX ADMIN — MAGNESIUM HYDROXIDE 30 MILLILITER(S): 400 SUSPENSION ORAL at 06:14

## 2025-05-27 RX ADMIN — MAGNESIUM HYDROXIDE 30 MILLILITER(S): 400 SUSPENSION ORAL at 00:15

## 2025-05-27 RX ADMIN — Medication 975 MILLIGRAM(S): at 21:25

## 2025-05-27 RX ADMIN — Medication 80 MILLIGRAM(S): at 17:51

## 2025-05-27 RX ADMIN — Medication 600 MILLIGRAM(S): at 14:04

## 2025-05-27 RX ADMIN — Medication 600 MILLIGRAM(S): at 18:21

## 2025-05-27 RX ADMIN — Medication 600 MILLIGRAM(S): at 06:15

## 2025-05-27 RX ADMIN — Medication 2 TABLET(S): at 22:07

## 2025-05-27 RX ADMIN — Medication 975 MILLIGRAM(S): at 15:29

## 2025-05-27 RX ADMIN — HEPARIN SODIUM 5000 UNIT(S): 1000 INJECTION INTRAVENOUS; SUBCUTANEOUS at 18:07

## 2025-05-27 NOTE — BH CONSULTATION LIAISON ASSESSMENT NOTE - SUMMARY
29F, no PPH, admitted for delivery, now s/p  POD1, psych c/s due to depression, SI statement as above.    Patient does not meet criterion for MDD, postpartum depression, bipolar d/o, or schizophrenia. Mood currently stable, prior statements made out of fear and frustration as a method to avoid getting a  but had no intent or plan.     Recs:  no role for meds  no psych c/i to discharge  can f/u with  psych, discussed with SW  copy paste DC info into DC paperwork

## 2025-05-27 NOTE — BH CONSULTATION LIAISON ASSESSMENT NOTE - DESCRIPTION
recent imigrant, lives with , limited social support, Protestant, no etoh / drugs, works in car [employer]

## 2025-05-27 NOTE — BH CONSULTATION LIAISON ASSESSMENT NOTE - HPI (INCLUDE ILLNESS QUALITY, SEVERITY, DURATION, TIMING, CONTEXT, MODIFYING FACTORS, ASSOCIATED SIGNS AND SYMPTOMS)
29F, no PPH, admitted for delivery, now s/p  POD1, psych c/s due to depression, SI statement as above.    Patient interviewed with  - patient felt pregnancy was mostly fine without notable mood changes, but has chronic anxiety around hospitals and needles which was exacerbated when she needed a C section which she did not want in her birth plan initially. Said "I'd rather die than get a " but when told by numerous MD's that fetus may die and of the emergent nature of situation she eventually acquiesced. Denies depression currently, is holding baby and smiling, has concrete future goals with baby in mind and for daughter's future. Denies suicidality/intent or plan and denies homicidality/intent or plan. Denies AH/VH, delusions or paranoia. Had some anxiety and nightmares over past few weeks. Denies decreased need for sleep, increased energy, racing thoughts, irritability euphoria or grandiosity.     Spoke to  - notes no acute safety concerns, is hoping patient's mother will be able to visit

## 2025-05-27 NOTE — BH CONSULTATION LIAISON ASSESSMENT NOTE - NSBHCHARTREVIEWVS_PSY_A_CORE FT
Vital Signs Last 24 Hrs  T(C): 36.3 (27 May 2025 10:37), Max: 37 (26 May 2025 17:39)  T(F): 97.4 (27 May 2025 10:37), Max: 98.6 (26 May 2025 17:39)  HR: 86 (27 May 2025 10:37) (84 - 95)  BP: 120/71 (27 May 2025 10:37) (109/63 - 120/77)  BP(mean): --  RR: 18 (27 May 2025 10:37) (18 - 18)  SpO2: 100% (27 May 2025 10:37) (98% - 100%)    Parameters below as of 27 May 2025 10:37  Patient On (Oxygen Delivery Method): room air

## 2025-05-27 NOTE — BH CONSULTATION LIAISON ASSESSMENT NOTE - MSE UNSTRUCTURED FT
Mental Status Exam:  Grisel: well groomed, fair hygiene     Behavior: calm, cooperative, no psychomotor retardation/agitation  Motor: no tremors, EPS, or rigidity  Gait: did not assess, pt in bed  Speech: normal rate, rhythm, prosedy and volume   Mood: "okay"  Affect: euthymic, congruent  Thought process: clear, goal directed   Thought Content: denies paranoia, delusions   Perception: denies AH/VH  SI: denies  HI: denies  Insight: fair  Judgment: fair    Cognitive Exam:  Orientation: AOx3  Recall: intact  Attention: intact  Abstraction: intact

## 2025-05-27 NOTE — BH CONSULTATION LIAISON ASSESSMENT NOTE - CURRENT MEDICATION
MEDICATIONS  (STANDING):  acetaminophen     Tablet .. 975 milliGRAM(s) Oral <User Schedule>  diphtheria/tetanus/pertussis (acellular) Vaccine (Adacel) 0.5 milliLiter(s) IntraMuscular once  heparin   Injectable 5000 Unit(s) SubCutaneous every 12 hours  ibuprofen  Tablet. 600 milliGRAM(s) Oral every 6 hours  lactated ringers Bolus 500 milliLiter(s) IV Bolus once  lactated ringers Bolus 500 milliLiter(s) IV Bolus once    MEDICATIONS  (PRN):  diphenhydrAMINE 25 milliGRAM(s) Oral every 6 hours PRN Pruritus  lanolin Ointment 1 Application(s) Topical every 6 hours PRN Sore Nipples  magnesium hydroxide Suspension 30 milliLiter(s) Oral two times a day PRN Constipation  oxyCODONE    IR 5 milliGRAM(s) Oral once PRN Moderate to Severe Pain (4-10)  oxyCODONE    IR 5 milliGRAM(s) Oral every 3 hours PRN Moderate to Severe Pain (4-10)  polyethylene glycol 3350 17 Gram(s) Oral daily PRN Constipation  simethicone 80 milliGRAM(s) Chew every 4 hours PRN Gas

## 2025-05-27 NOTE — BH CONSULTATION LIAISON ASSESSMENT NOTE - NSBHCONSULTFOLLOWAFTERCARE_PSY_A_CORE FT
[facility]  [phone] [place] [address], First Floor, [place] [address]  [phone]  [email or web address]     [facility] Central Intake: [phone]     psych [phone]    This patient would benefit from early visa processing of her mother to facilitate entry to the [place] so as to allow patient to benefit from familial childcare. This would greatly reduce this patient's stress level and thus decrease the risk of postpartum depression.

## 2025-05-27 NOTE — BH CONSULTATION LIAISON ASSESSMENT NOTE - RISK ASSESSMENT
No current SI/HI, protected by dependant child, Protestant, future orientation, lack of prior SI/SA, lack of etoh/drug, supportive family though geographically distant.

## 2025-05-28 RX ORDER — BISACODYL 5 MG
10 TABLET, DELAYED RELEASE (ENTERIC COATED) ORAL ONCE
Refills: 0 | Status: COMPLETED | OUTPATIENT
Start: 2025-05-28 | End: 2025-05-28

## 2025-05-28 RX ADMIN — Medication 80 MILLIGRAM(S): at 11:30

## 2025-05-28 RX ADMIN — Medication 600 MILLIGRAM(S): at 23:33

## 2025-05-28 RX ADMIN — Medication 10 MILLIGRAM(S): at 15:03

## 2025-05-28 RX ADMIN — MAGNESIUM HYDROXIDE 30 MILLILITER(S): 400 SUSPENSION ORAL at 00:30

## 2025-05-28 RX ADMIN — Medication 600 MILLIGRAM(S): at 17:41

## 2025-05-28 RX ADMIN — Medication 975 MILLIGRAM(S): at 20:23

## 2025-05-28 RX ADMIN — OXYCODONE HYDROCHLORIDE 5 MILLIGRAM(S): 30 TABLET ORAL at 11:31

## 2025-05-28 RX ADMIN — Medication 975 MILLIGRAM(S): at 03:35

## 2025-05-28 RX ADMIN — Medication 325 MILLIGRAM(S): at 11:31

## 2025-05-28 RX ADMIN — HEPARIN SODIUM 5000 UNIT(S): 1000 INJECTION INTRAVENOUS; SUBCUTANEOUS at 17:42

## 2025-05-28 RX ADMIN — Medication 975 MILLIGRAM(S): at 09:00

## 2025-05-28 RX ADMIN — Medication 80 MILLIGRAM(S): at 23:33

## 2025-05-28 RX ADMIN — Medication 600 MILLIGRAM(S): at 12:00

## 2025-05-28 RX ADMIN — Medication 500 MILLIGRAM(S): at 11:33

## 2025-05-28 RX ADMIN — Medication 600 MILLIGRAM(S): at 01:05

## 2025-05-28 RX ADMIN — Medication 2 TABLET(S): at 23:34

## 2025-05-28 RX ADMIN — Medication 600 MILLIGRAM(S): at 06:20

## 2025-05-28 RX ADMIN — Medication 600 MILLIGRAM(S): at 00:30

## 2025-05-28 RX ADMIN — OXYCODONE HYDROCHLORIDE 5 MILLIGRAM(S): 30 TABLET ORAL at 12:00

## 2025-05-28 RX ADMIN — Medication 600 MILLIGRAM(S): at 05:43

## 2025-05-28 RX ADMIN — Medication 600 MILLIGRAM(S): at 11:32

## 2025-05-28 RX ADMIN — MAGNESIUM HYDROXIDE 30 MILLILITER(S): 400 SUSPENSION ORAL at 23:34

## 2025-05-28 RX ADMIN — Medication 975 MILLIGRAM(S): at 15:30

## 2025-05-28 RX ADMIN — MAGNESIUM HYDROXIDE 30 MILLILITER(S): 400 SUSPENSION ORAL at 11:30

## 2025-05-28 RX ADMIN — Medication 975 MILLIGRAM(S): at 15:02

## 2025-05-28 RX ADMIN — Medication 975 MILLIGRAM(S): at 21:23

## 2025-05-28 RX ADMIN — Medication 975 MILLIGRAM(S): at 03:00

## 2025-05-28 RX ADMIN — Medication 975 MILLIGRAM(S): at 09:30

## 2025-05-29 VITALS
HEART RATE: 85 BPM | OXYGEN SATURATION: 100 % | DIASTOLIC BLOOD PRESSURE: 75 MMHG | RESPIRATION RATE: 18 BRPM | SYSTOLIC BLOOD PRESSURE: 112 MMHG | TEMPERATURE: 99 F

## 2025-05-29 DIAGNOSIS — Z09 ENCOUNTER FOR FOLLOW-UP EXAMINATION AFTER COMPLETED TREATMENT FOR CONDITIONS OTHER THAN MALIGNANT NEOPLASM: ICD-10-CM

## 2025-05-29 RX ORDER — IBUPROFEN 200 MG
1 TABLET ORAL
Qty: 0 | Refills: 0 | DISCHARGE
Start: 2025-05-29

## 2025-05-29 RX ORDER — IBUPROFEN 800 MG/1
800 TABLET, FILM COATED ORAL EVERY 8 HOURS
Qty: 90 | Refills: 0 | Status: ACTIVE | COMMUNITY
Start: 2025-05-29 | End: 1900-01-01

## 2025-05-29 RX ORDER — DOCUSATE SODIUM 100 MG/1
100 CAPSULE, LIQUID FILLED ORAL
Qty: 60 | Refills: 0 | Status: ACTIVE | COMMUNITY
Start: 2025-05-29 | End: 1900-01-01

## 2025-05-29 RX ORDER — SIMETHICONE 80 MG
1 TABLET,CHEWABLE ORAL
Refills: 0
Start: 2025-05-29

## 2025-05-29 RX ORDER — PRENATAL 136/IRON/FOLIC ACID 27 MG-1 MG
1 TABLET ORAL DAILY
Refills: 0 | Status: DISCONTINUED | OUTPATIENT
Start: 2025-05-29 | End: 2025-05-29

## 2025-05-29 RX ORDER — SIMETHICONE 80 MG
1 TABLET,CHEWABLE ORAL
Qty: 60 | Refills: 0
Start: 2025-05-29 | End: 2025-06-12

## 2025-05-29 RX ORDER — ACETAMINOPHEN 500 MG/5ML
3 LIQUID (ML) ORAL
Qty: 180 | Refills: 0
Start: 2025-05-29 | End: 2025-06-12

## 2025-05-29 RX ORDER — ACETAMINOPHEN 500 MG/5ML
3 LIQUID (ML) ORAL
Qty: 0 | Refills: 0 | DISCHARGE
Start: 2025-05-29

## 2025-05-29 RX ORDER — SIMETHICONE 80 MG
1 TABLET,CHEWABLE ORAL
Qty: 0 | Refills: 0 | DISCHARGE
Start: 2025-05-29

## 2025-05-29 RX ORDER — IBUPROFEN 200 MG
1 TABLET ORAL
Qty: 60 | Refills: 0
Start: 2025-05-29 | End: 2025-06-12

## 2025-05-29 RX ORDER — PRENATAL 136/IRON/FOLIC ACID 27 MG-1 MG
1 TABLET ORAL
Qty: 0 | Refills: 0 | DISCHARGE
Start: 2025-05-29

## 2025-05-29 RX ORDER — SIMETHICONE 125 MG/1
125 TABLET, CHEWABLE ORAL
Qty: 40 | Refills: 0 | Status: ACTIVE | COMMUNITY
Start: 2025-05-29 | End: 1900-01-01

## 2025-05-29 RX ADMIN — Medication 975 MILLIGRAM(S): at 04:11

## 2025-05-29 RX ADMIN — Medication 600 MILLIGRAM(S): at 06:56

## 2025-05-29 RX ADMIN — Medication 975 MILLIGRAM(S): at 03:11

## 2025-05-29 RX ADMIN — Medication 600 MILLIGRAM(S): at 00:33

## 2025-05-29 RX ADMIN — Medication 600 MILLIGRAM(S): at 13:07

## 2025-05-29 RX ADMIN — Medication 600 MILLIGRAM(S): at 13:37

## 2025-05-29 RX ADMIN — Medication 600 MILLIGRAM(S): at 06:03

## 2025-05-29 RX ADMIN — HEPARIN SODIUM 5000 UNIT(S): 1000 INJECTION INTRAVENOUS; SUBCUTANEOUS at 06:03

## 2025-05-29 RX ADMIN — Medication 975 MILLIGRAM(S): at 09:14

## 2025-05-29 RX ADMIN — Medication 975 MILLIGRAM(S): at 09:44

## 2025-05-29 NOTE — PROGRESS NOTE ADULT - SUBJECTIVE AND OBJECTIVE BOX
Postop Day  __1_ s/p   C- Section    THERAPY:  [  ] Spinal morphine   [ x ] Epidural morphine   [  ] IV PCA Hydromorphone 1 mg/ml    acetaminophen     Tablet .. 975 milliGRAM(s) Oral <User Schedule>  diphenhydrAMINE 25 milliGRAM(s) Oral every 6 hours PRN  diphtheria/tetanus/pertussis (acellular) Vaccine (Adacel) 0.5 milliLiter(s) IntraMuscular once  heparin   Injectable 5000 Unit(s) SubCutaneous every 12 hours  ibuprofen  Tablet. 600 milliGRAM(s) Oral every 6 hours  lactated ringers Bolus 500 milliLiter(s) IV Bolus once  lactated ringers Bolus 500 milliLiter(s) IV Bolus once  lanolin Ointment 1 Application(s) Topical every 6 hours PRN  magnesium hydroxide Suspension 30 milliLiter(s) Oral two times a day PRN  oxyCODONE    IR 5 milliGRAM(s) Oral every 3 hours PRN  oxyCODONE    IR 5 milliGRAM(s) Oral once PRN  simethicone 80 milliGRAM(s) Chew every 4 hours PRN      T(C): 36.6 (05-27-25 @ 06:00), Max: 37 (05-26-25 @ 14:18)  HR: 84 (05-27-25 @ 06:00) (84 - 97)  BP: 120/77 (05-27-25 @ 06:00) (108/73 - 120/77)  RR: 18 (05-27-25 @ 06:00) (18 - 18)  SpO2: 98% (05-27-25 @ 06:00) (97% - 100%)    Pain:   ______mild_____ at rest;  _____moderate______with activity    Sedation Score:	  [ x ] Alert	    [  ] Drowsy        [  ] Arousable	[  ] Asleep	[  ] Unresponsive    Side Effects:	  [  x] None	     [  ] Nausea        [  ] Pruritus        [  ] Weakness   [  ] Numbness        ASSESSMENT/ PLAN  [   ] Side effects resolving      [   ] Patient made aware of PRN meds available     [ x] Discontinue & switch to PRN pain medications        [  ] Continue       Patient states lower extremities feel and move normally. No apparent anesthetic complications.
S: 30yo POD#3 s/p vac assisted pLTCS for category 2 tracing.  QBL: 307.  The patient complains of gas pain/constipation.  She is tolerating a regular diet and passing flatus. She is voiding spontaneously, and ambulating without difficulty. Denies CP/SOB. Denies lightheadedness/dizziness. Denies N/V.    O:  Vitals:  Vital Signs Last 24 Hrs  T(C): 36.6 (28 May 2025 05:46), Max: 36.7 (27 May 2025 22:11)  T(F): 97.8 (28 May 2025 05:46), Max: 98.1 (27 May 2025 22:11)  HR: 87 (28 May 2025 05:46) (73 - 87)  BP: 120/67 (28 May 2025 05:46) (118/58 - 120/67)  BP(mean): --  RR: 18 (28 May 2025 05:46) (18 - 18)  SpO2: 98% (28 May 2025 05:46) (98% - 100%)        MEDICATIONS  (STANDING):  acetaminophen     Tablet .. 975 milliGRAM(s) Oral <User Schedule>  ascorbic acid 500 milliGRAM(s) Oral daily  diphtheria/tetanus/pertussis (acellular) Vaccine (Adacel) 0.5 milliLiter(s) IntraMuscular once  ferrous    sulfate 325 milliGRAM(s) Oral daily  heparin   Injectable 5000 Unit(s) SubCutaneous every 12 hours  ibuprofen  Tablet. 600 milliGRAM(s) Oral every 6 hours  lactated ringers Bolus 500 milliLiter(s) IV Bolus once  lactated ringers Bolus 500 milliLiter(s) IV Bolus once  senna 2 Tablet(s) Oral at bedtime    MEDICATIONS  (PRN):  diphenhydrAMINE 25 milliGRAM(s) Oral every 6 hours PRN Pruritus  lanolin Ointment 1 Application(s) Topical every 6 hours PRN Sore Nipples  magnesium hydroxide Suspension 30 milliLiter(s) Oral two times a day PRN Constipation  oxyCODONE    IR 5 milliGRAM(s) Oral once PRN Moderate to Severe Pain (4-10)  oxyCODONE    IR 5 milliGRAM(s) Oral every 3 hours PRN Moderate to Severe Pain (4-10)  polyethylene glycol 3350 17 Gram(s) Oral daily PRN Constipation  simethicone 80 milliGRAM(s) Chew every 4 hours PRN Gas      LABS:  Blood type: B Positive  Rubella IgG: RPR: Negative                          10.5[L]   17.62[H] >-----------< 203    ( 05-26 @ 05:25 )             31.9[L]    Physical exam:  Gen: NAD  Abdomen: Soft, nontender, no distension , firm uterine fundus at umbilicus.  Incision: Clean, dry, and intact with dermabond.    Pelvic: Normal lochia noted  Ext: No calf tenderness, +2 edema.      A/P: 30yo POD#3 s/p pLTCS.  Patient is stable and is doing well post-operatively.  - Continue motrin, tylenol, oxycodone PRN for pain control.  - Increase ambulation  - Encouraged use of abdominal binder  - Continue regular diet  - Seen and cleared by SW & psych  - Discharge planning, patient would like to go home tomorrow.    Sherri PAGAN        
ANESTHESIA POSTOP CHECK    29y Female POSTOP DAY 1     Vital Signs Last 24 Hrs  T(C): 36.6 (27 May 2025 06:00), Max: 37 (26 May 2025 14:18)  T(F): 97.8 (27 May 2025 06:00), Max: 98.6 (26 May 2025 14:18)  HR: 84 (27 May 2025 06:00) (84 - 97)  BP: 120/77 (27 May 2025 06:00) (108/73 - 120/77)  BP(mean): --  RR: 18 (27 May 2025 06:00) (18 - 18)  SpO2: 98% (27 May 2025 06:00) (97% - 100%)      I&O's Summary      [X ] NO APPARENT ANESTHESIA COMPLICATIONS      Comments: 
OB Postpartum Note: Primary  Delivery, POD#4    S: 30yo POD#4 s/p unscheduled vacuum-assisted pLTCS for category 2 FHT. The patient feels well, however states a sharp lower abdominal pain if she is turning to her side laying in bed or while nursing her baby.  Pain is well controlled. She is tolerating a regular diet and passing flatus. She is voiding spontaneously, and ambulating without difficulty. Denies CP/SOB. Denies lightheadedness/dizziness. Denies N/V.    O:  Vitals:  Vital Signs Last 24 Hrs  T(C): 36.8 (29 May 2025 06:00), Max: 36.8 (29 May 2025 06:00)  T(F): 98.3 (29 May 2025 06:00), Max: 98.3 (29 May 2025 06:00)  HR: 85 (29 May 2025 06:00) (80 - 85)  BP: 137/77 (29 May 2025 06:00) (117/76 - 137/77)  BP(mean): --  RR: 17 (29 May 2025 06:00) (17 - 18)  SpO2: 98% (29 May 2025 06:00) (98% - 100%)    Parameters below as of 28 May 2025 14:16  Patient On (Oxygen Delivery Method): room air        MEDICATIONS  (STANDING):  acetaminophen     Tablet .. 975 milliGRAM(s) Oral <User Schedule>  ascorbic acid 500 milliGRAM(s) Oral daily  diphtheria/tetanus/pertussis (acellular) Vaccine (Adacel) 0.5 milliLiter(s) IntraMuscular once  ferrous    sulfate 325 milliGRAM(s) Oral daily  heparin   Injectable 5000 Unit(s) SubCutaneous every 12 hours  ibuprofen  Tablet. 600 milliGRAM(s) Oral every 6 hours  lactated ringers Bolus 500 milliLiter(s) IV Bolus once  lactated ringers Bolus 500 milliLiter(s) IV Bolus once  senna 2 Tablet(s) Oral at bedtime    MEDICATIONS  (PRN):  diphenhydrAMINE 25 milliGRAM(s) Oral every 6 hours PRN Pruritus  lanolin Ointment 1 Application(s) Topical every 6 hours PRN Sore Nipples  magnesium hydroxide Suspension 30 milliLiter(s) Oral two times a day PRN Constipation  oxyCODONE    IR 5 milliGRAM(s) Oral once PRN Moderate to Severe Pain (4-10)  oxyCODONE    IR 5 milliGRAM(s) Oral every 3 hours PRN Moderate to Severe Pain (4-10)  polyethylene glycol 3350 17 Gram(s) Oral daily PRN Constipation  simethicone 80 milliGRAM(s) Chew every 4 hours PRN Gas      LABS:  Blood type: B Positive  Rubella IgG: RPR: Negative                    Physical exam:  Gen: NAD  Abdomen: Soft, nontender, no distension , firm uterine fundus at umbilicus.  Incision: Clean, dry, and intact   Pelvic: Normal lochia noted  Ext: No calf tenderness    A/P: 30yo POD#4 s/p pLTCS.  Patient is stable and is doing well post-operatively.    #anxiety  - Seen and cleared by SW & psych  - Pt feels well    #Postpartum  - Instructed to schedule incision check at 2 weeks postpartum in OB office, then routine 6 week postpartum visit  - Continue motrin, tylenol, oxycodone PRN for pain control.  - Increase ambulation  - Continue regular diet  - Discharge planned for today      Carmen STEEL-BC        
Postpartum Note,  Section  She is a  29y woman who is now post-operative day: 2    Subjective:  The patient feels well.  She is ambulating.   She is tolerating regular diet.  She denies nausea and vomiting.  She is voiding.  Her pain is controlled.  She reports normal postpartum bleeding    Physical exam:    Vital Signs Last 24 Hrs  T(C): 36.3 (27 May 2025 10:37), Max: 37 (26 May 2025 14:18)  T(F): 97.4 (27 May 2025 10:37), Max: 98.6 (26 May 2025 14:18)  HR: 86 (27 May 2025 10:37) (84 - 97)  BP: 120/71 (27 May 2025 10:37) (108/73 - 120/77)  BP(mean): --  RR: 18 (27 May 2025 10:37) (18 - 18)  SpO2: 100% (27 May 2025 10:37) (98% - 100%)    Parameters below as of 27 May 2025 10:37  Patient On (Oxygen Delivery Method): room air        Gen: NAD  Breast: Soft, nontender, not engorged.  Abdomen: Soft, nontender, no distension , firm uterine fundus at umbilicus.  Incision: Clean, dry, and intact with steri strips  Pelvic: Normal lochia noted  Ext: No calf tenderness    LABS:                        10.5   17.62 )-----------(       ( 26 May 2025 05:25 )             31.9                   Allergies    No Known Allergies    Intolerances      MEDICATIONS  (STANDING):  acetaminophen     Tablet .. 975 milliGRAM(s) Oral <User Schedule>  diphtheria/tetanus/pertussis (acellular) Vaccine (Adacel) 0.5 milliLiter(s) IntraMuscular once  heparin   Injectable 5000 Unit(s) SubCutaneous every 12 hours  ibuprofen  Tablet. 600 milliGRAM(s) Oral every 6 hours  lactated ringers Bolus 500 milliLiter(s) IV Bolus once  lactated ringers Bolus 500 milliLiter(s) IV Bolus once    MEDICATIONS  (PRN):  diphenhydrAMINE 25 milliGRAM(s) Oral every 6 hours PRN Pruritus  lanolin Ointment 1 Application(s) Topical every 6 hours PRN Sore Nipples  magnesium hydroxide Suspension 30 milliLiter(s) Oral two times a day PRN Constipation  oxyCODONE    IR 5 milliGRAM(s) Oral every 3 hours PRN Moderate to Severe Pain (4-10)  oxyCODONE    IR 5 milliGRAM(s) Oral once PRN Moderate to Severe Pain (4-10)  polyethylene glycol 3350 17 Gram(s) Oral daily PRN Constipation  simethicone 80 milliGRAM(s) Chew every 4 hours PRN Gas        Assessment and Plan  POD # 2 s/p  section  Doing well.  Encourage ambulation.  c/o a lot of pain and constipation  informed of available medication  psych consult pending      
pt seen and evaluated by me, POD # 4  stable for discharge , follow up in office in 6 weeks. 
OB Progress Note:  Delivery, POD#1    S: 30yo POD#1 s/p VA-pLTCS. Patient reports lower abdominal pain that she believes is due to constipation. She reports pain medications help improve pain. She is tolerating a regular diet and passing flatus. Endorses light vaginal bleeding, less than one pad per hour. She is ambulating without difficulty. Voiding spontaneously.  Denies nausea, vomiting, fevers, chills. Denies CP/SOB/lightheadedness/dizziness/headaches.     O:   Vital Signs Last 24 Hrs  T(C): 36.9 (26 May 2025 05:50), Max: 37.2 (25 May 2025 18:26)  T(F): 98.5 (26 May 2025 05:50), Max: 99 (25 May 2025 18:26)  HR: 88 (26 May 2025 05:50) (78 - 89)  BP: 116/60 (26 May 2025 05:50) (101/59 - 139/77)  BP(mean): 93 (25 May 2025 08:45) (93 - 93)  RR: 18 (26 May 2025 05:50) (17 - 21)  SpO2: 99% (26 May 2025 05:50) (95% - 100%)    Parameters below as of 26 May 2025 02:07  Patient On (Oxygen Delivery Method): room air        Labs:  Blood type: B Positive  Rubella IgG: RPR: Negative                          10.5[L]   17.62[H] >-----------< 203    (  @ 05:25 )             31.9[L]                        12.4   12.66[H] >-----------< 274    (  @ 01:15 )             37.9                  PE:  General: NAD  Heart: RRR  Lungs: CTAB, equal breath sounds, non-labored  Abdomen: Distended, appropriately tender, fundus firm, incision c/d/i. Prineo in place  Extremities: No erythema, no pitting edema, no calf tenderness  Gyn: lochia wnl

## 2025-05-30 ENCOUNTER — APPOINTMENT (OUTPATIENT)
Dept: OBGYN | Facility: CLINIC | Age: 30
End: 2025-05-30

## 2025-05-30 PROBLEM — F41.9 ANXIETY DISORDER, UNSPECIFIED: Chronic | Status: ACTIVE | Noted: 2025-05-24

## 2025-06-05 ENCOUNTER — APPOINTMENT (OUTPATIENT)
Age: 30
End: 2025-06-05
Payer: COMMERCIAL

## 2025-06-05 ENCOUNTER — APPOINTMENT (OUTPATIENT)
Dept: OBGYN | Facility: CLINIC | Age: 30
End: 2025-06-05
Payer: COMMERCIAL

## 2025-06-05 PROCEDURE — S9445: CPT

## 2025-06-05 PROCEDURE — 99211 OFF/OP EST MAY X REQ PHY/QHP: CPT

## 2025-06-06 ENCOUNTER — APPOINTMENT (OUTPATIENT)
Dept: OBGYN | Facility: CLINIC | Age: 30
End: 2025-06-06

## 2025-06-11 ENCOUNTER — APPOINTMENT (OUTPATIENT)
Age: 30
End: 2025-06-11

## 2025-06-12 LAB — SURGICAL PATHOLOGY STUDY: SIGNIFICANT CHANGE UP

## 2025-06-26 ENCOUNTER — APPOINTMENT (OUTPATIENT)
Dept: OBGYN | Facility: CLINIC | Age: 30
End: 2025-06-26

## 2025-06-27 ENCOUNTER — APPOINTMENT (OUTPATIENT)
Dept: OBGYN | Facility: CLINIC | Age: 30
End: 2025-06-27
Payer: COMMERCIAL

## 2025-06-27 PROCEDURE — 99211 OFF/OP EST MAY X REQ PHY/QHP: CPT

## 2025-07-01 ENCOUNTER — RX RENEWAL (OUTPATIENT)
Age: 30
End: 2025-07-01

## 2025-07-11 ENCOUNTER — NON-APPOINTMENT (OUTPATIENT)
Age: 30
End: 2025-07-11

## 2025-07-11 ENCOUNTER — APPOINTMENT (OUTPATIENT)
Dept: OBGYN | Facility: CLINIC | Age: 30
End: 2025-07-11

## 2025-07-11 VITALS
HEIGHT: 62 IN | BODY MASS INDEX: 28.16 KG/M2 | HEART RATE: 64 BPM | WEIGHT: 153 LBS | SYSTOLIC BLOOD PRESSURE: 107 MMHG | DIASTOLIC BLOOD PRESSURE: 70 MMHG

## 2025-07-11 PROCEDURE — 99213 OFFICE O/P EST LOW 20 MIN: CPT | Mod: TH

## 2025-07-15 ENCOUNTER — APPOINTMENT (OUTPATIENT)
Age: 30
End: 2025-07-15

## 2025-07-17 ENCOUNTER — EMERGENCY (EMERGENCY)
Facility: HOSPITAL | Age: 30
LOS: 1 days | End: 2025-07-17
Attending: EMERGENCY MEDICINE | Admitting: EMERGENCY MEDICINE
Payer: COMMERCIAL

## 2025-07-17 VITALS
SYSTOLIC BLOOD PRESSURE: 117 MMHG | OXYGEN SATURATION: 96 % | HEIGHT: 61 IN | TEMPERATURE: 98 F | DIASTOLIC BLOOD PRESSURE: 78 MMHG | HEART RATE: 81 BPM | WEIGHT: 153 LBS | RESPIRATION RATE: 18 BRPM

## 2025-07-17 DIAGNOSIS — Z33.2 ENCOUNTER FOR ELECTIVE TERMINATION OF PREGNANCY: Chronic | ICD-10-CM

## 2025-07-17 PROCEDURE — 73130 X-RAY EXAM OF HAND: CPT | Mod: 26,RT

## 2025-07-17 PROCEDURE — 99284 EMERGENCY DEPT VISIT MOD MDM: CPT

## 2025-07-17 PROCEDURE — 73110 X-RAY EXAM OF WRIST: CPT | Mod: 26,RT

## 2025-07-17 PROCEDURE — 73090 X-RAY EXAM OF FOREARM: CPT | Mod: 26,RT

## 2025-07-17 PROCEDURE — 72128 CT CHEST SPINE W/O DYE: CPT | Mod: 26
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
## 2025-07-17 NOTE — ED PROVIDER NOTE - PATIENT PORTAL LINK FT
You can access the FollowMyHealth Patient Portal offered by [facility] by registering at the following website: [email or web address]. By joining [employer]’s FollowMyHealth portal, you will also be able to view your health information using other applications (apps) compatible with our system.

## 2025-07-17 NOTE — ED ADULT NURSE NOTE - NSFALLUNIVINTERV_ED_ALL_ED
Bed/Stretcher in lowest position, wheels locked, appropriate side rails in place/Call bell, personal items and telephone in reach/Instruct patient to call for assistance before getting out of bed/chair/stretcher/Non-slip footwear applied when patient is off stretcher/[place] to call system/Physically safe environment - no spills, clutter or unnecessary equipment/Purposeful proactive rounding/Room/bathroom lighting operational, light cord in reach

## 2025-07-17 NOTE — ED PROVIDER NOTE - PHYSICAL EXAMINATION
PE: head: normocephalic, atraumatic; HEENT: midline trachea, no hemotympanum, no CSF rhinorrhea or otorrhea, no Hammer signs, no Raccoon eyes, no midline neck ttp; Lungs: CTA b/l, Heart: s1, s2, rrr, abdomen: soft, NT, ND; extremities: moves all 4, no deformities, +  midline thoracic ttp T4/5; neuro: PERRLA, EOMI, neuro intact; skin: + multiple abrasions to the left cheek with swelling, + swelling and ecchymosis to the thenar eminence of the right hand, wrist and forearm without deformity
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
## 2025-07-17 NOTE — ED PROVIDER NOTE - NSFOLLOWUPINSTRUCTIONS_ED_ALL_ED_FT
Advance activity as tolerated.  Continue all previously prescribed medications as directed unless otherwise instructed.  Follow up with your primary care physician in 48-72 hours- bring copies of your results.  Return to the ER for worsening or persistent symptoms, and/or ANY NEW OR CONCERNING SYMPTOMS. If you have issues obtaining follow up, please call: [phone] to obtain a doctor or specialist who takes your insurance in your area.  You may call [phone] to make an appointment with the internal medicine clinic.
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
## 2025-08-01 ENCOUNTER — RX RENEWAL (OUTPATIENT)
Age: 30
End: 2025-08-01

## 2025-08-29 ENCOUNTER — APPOINTMENT (OUTPATIENT)
Dept: OBGYN | Facility: CLINIC | Age: 30
End: 2025-08-29
Payer: COMMERCIAL

## 2025-08-29 VITALS
SYSTOLIC BLOOD PRESSURE: 101 MMHG | HEIGHT: 62 IN | DIASTOLIC BLOOD PRESSURE: 69 MMHG | HEART RATE: 75 BPM | BODY MASS INDEX: 27.97 KG/M2 | WEIGHT: 152 LBS

## 2025-08-29 DIAGNOSIS — N89.8 OTHER SPECIFIED NONINFLAMMATORY DISORDERS OF VAGINA: ICD-10-CM

## 2025-08-29 PROCEDURE — 99459 PELVIC EXAMINATION: CPT

## 2025-08-29 PROCEDURE — 99213 OFFICE O/P EST LOW 20 MIN: CPT

## 2025-09-02 DIAGNOSIS — B37.31 ACUTE CANDIDIASIS OF VULVA AND VAGINA: ICD-10-CM

## 2025-09-02 LAB
BV BACTERIA RRNA VAG QL NAA+PROBE: NOT DETECTED
C GLABRATA RNA VAG QL NAA+PROBE: NOT DETECTED
CANDIDA RRNA VAG QL PROBE: DETECTED
T VAGINALIS RRNA SPEC QL NAA+PROBE: NOT DETECTED

## 2025-09-02 RX ORDER — FLUCONAZOLE 150 MG/1
150 TABLET ORAL
Qty: 2 | Refills: 1 | Status: ACTIVE | COMMUNITY
Start: 2025-09-02 | End: 1900-01-01

## 2025-09-15 ENCOUNTER — RX RENEWAL (OUTPATIENT)
Age: 30
End: 2025-09-15

## (undated) DEVICE — DRSG DERMABOND PRINEO 22CM